# Patient Record
Sex: MALE | Race: OTHER | Employment: FULL TIME | URBAN - METROPOLITAN AREA
[De-identification: names, ages, dates, MRNs, and addresses within clinical notes are randomized per-mention and may not be internally consistent; named-entity substitution may affect disease eponyms.]

---

## 2021-10-14 ENCOUNTER — OFFICE VISIT (OUTPATIENT)
Dept: ORTHOPEDIC SURGERY | Age: 31
End: 2021-10-14
Payer: COMMERCIAL

## 2021-10-14 ENCOUNTER — HOSPITAL ENCOUNTER (OUTPATIENT)
Dept: ORTHOPEDIC SURGERY | Age: 31
Discharge: HOME | End: 2021-10-16
Payer: COMMERCIAL

## 2021-10-14 VITALS
WEIGHT: 262 LBS | HEIGHT: 72 IN | HEART RATE: 64 BPM | OXYGEN SATURATION: 99 % | TEMPERATURE: 97.4 F | BODY MASS INDEX: 35.49 KG/M2

## 2021-10-14 DIAGNOSIS — R52 PAIN: Primary | ICD-10-CM

## 2021-10-14 DIAGNOSIS — R52 PAIN: ICD-10-CM

## 2021-10-14 DIAGNOSIS — S83.401A SPRAIN OF COLLATERAL LIGAMENT OF RIGHT KNEE, INITIAL ENCOUNTER: ICD-10-CM

## 2021-10-14 PROCEDURE — 73562 X-RAY EXAM OF KNEE 3: CPT

## 2021-10-14 PROCEDURE — 99204 OFFICE O/P NEW MOD 45 MIN: CPT | Performed by: ORTHOPAEDIC SURGERY

## 2021-10-14 PROCEDURE — 73562 X-RAY EXAM OF KNEE 3: CPT | Performed by: ORTHOPAEDIC SURGERY

## 2021-10-14 RX ORDER — MELOXICAM 15 MG/1
15 TABLET ORAL DAILY
Qty: 45 TABLET | Refills: 0 | Status: SHIPPED | OUTPATIENT
Start: 2021-10-14 | End: 2021-10-14 | Stop reason: CLARIF

## 2021-10-14 RX ORDER — MELOXICAM 15 MG/1
15 TABLET ORAL DAILY
Qty: 45 TABLET | Refills: 0 | Status: SHIPPED | OUTPATIENT
Start: 2021-10-14 | End: 2022-08-05

## 2021-10-14 RX ORDER — M-VIT,TX,IRON,MINS/CALC/FOLIC 27MG-0.4MG
1 TABLET ORAL DAILY
COMMUNITY
End: 2022-08-05

## 2021-10-14 NOTE — PROGRESS NOTES
Subjective:      Patient ID: Erasmo Bang is a 36 y.o. male who presents today for:  Chief Complaint   Patient presents with    Knee Pain     right knee pain pain x 3 months       HPI  Patient with right knee pain.'s been going on for the last 3 months. No specific history of trauma. Pain is primarily medially in the knee. Denies any history of any trauma. It affects his daily activity. It affects his exercising. History reviewed. No pertinent past medical history. History reviewed. No pertinent surgical history. Social History     Socioeconomic History    Marital status: Not on file     Spouse name: Not on file    Number of children: Not on file    Years of education: Not on file    Highest education level: Not on file   Occupational History    Not on file   Tobacco Use    Smoking status: Never Smoker    Smokeless tobacco: Never Used   Vaping Use    Vaping Use: Never used   Substance and Sexual Activity    Alcohol use: Yes    Drug use: Never    Sexual activity: Yes   Other Topics Concern    Not on file   Social History Narrative    Not on file     Social Determinants of Health     Financial Resource Strain:     Difficulty of Paying Living Expenses:    Food Insecurity:     Worried About Running Out of Food in the Last Year:     [address] in the Last Year:    Transportation Needs:     Lack of Transportation (Medical):      Lack of Transportation (Non-Medical):    Physical Activity:     Days of Exercise per Week:     Minutes of Exercise per Session:    Stress:     Feeling of Stress :    Social Connections:     Frequency of Communication with Friends and Family:     Frequency of Social Gatherings with Friends and Family:     Attends Anglican Services:     Active Member of Clubs or Organizations:     Attends Club or Organization Meetings:     Marital Status:    Intimate Partner Violence:     Fear of Current or Ex-Partner:     Emotionally Abused:     Physically Abused:  Sexually Abused:      History reviewed. No pertinent family history. Allergies   Allergen Reactions    Pcn [Penicillins]      Current Outpatient Medications on File Prior to Visit   Medication Sig Dispense Refill    Multiple Vitamins-Minerals (THERAPEUTIC MULTIVITAMIN-MINERALS) tablet Take 1 tablet by mouth daily       No current facility-administered medications on file prior to visit. Review of Systems  Pain right knee. Pains medially. Feels like occasionally the knee has something locked inside. Denies any giving way. Denies any numbness and tingling. Objective:   Pulse 64   Temp 97.4 °F (36.3 °C) (Temporal)   Ht 6' (1.829 m)   Wt 262 lb (118.8 kg)   SpO2 99%   BMI 35.53 kg/m²     Ortho Exam  Is not oriented slightly overweight male on the no acute distress. Right knee shows no effusion, erythema, or ecchymosis. Pain on the medial joint line. No pain on the lateral joint line. No pain anteriorly or posteriorly. No calf tenderness. No palpable cords posteriorly. No crepitus range of motion. No pain to patellar compression. Range of motion from 0 degrees to 130 degrees of flexion. Neurological and vascular status is intact in the right lower extremity. Negative roll test the right hip. Radiographs and Laboratory Studies:     Diagnostic Imaging Studies:    XR KNEE RIGHT (3 VIEWS)    Result Date: 10/14/2021  AP lateral and sunrise view of the right knee that we obtained to evaluate the source of pain shows good alignment. No signs of any arthritis. The joint space. Laboratory Studies:   No results found for: WBC, HGB, HCT, MCV, PLT  No results found for: SEDRATE  No results found for: CRP    Assessment:      Diagnosis Orders   1. Pain  XR KNEE RIGHT (3 VIEWS)   2. Sprain of collateral ligament of right knee, initial encounter            Plan:      This time I discussed the problem with the patient we will go ahead and start him on anti-inflammatory consisting of meloxicam 15 mg every day we will see if this alleviate the discomfort we will see him back in 2 to 3 weeks for recheck. If he is no better at that point he may need further radiological evaluation in the form of an MRI. Orders Placed This Encounter   Procedures    XR KNEE RIGHT (3 VIEWS)     Standing Status:   Future     Number of Occurrences:   1     Standing Expiration Date:   10/14/2022     Scheduling Instructions:      Weightbearing AP both knees, right lateral, right merchant     Order Specific Question:   Reason for exam:     Answer:   knee pain     No orders of the defined types were placed in this encounter. No follow-ups on file.       Karen Carson MD

## 2021-12-15 ENCOUNTER — OFFICE VISIT (OUTPATIENT)
Dept: ORTHOPEDIC SURGERY | Age: 31
End: 2021-12-15
Payer: COMMERCIAL

## 2021-12-15 VITALS
WEIGHT: 262 LBS | HEART RATE: 63 BPM | HEIGHT: 72 IN | BODY MASS INDEX: 35.49 KG/M2 | TEMPERATURE: 98 F | OXYGEN SATURATION: 97 %

## 2021-12-15 DIAGNOSIS — S83.241D ACUTE MEDIAL MENISCAL TEAR, RIGHT, SUBSEQUENT ENCOUNTER: Primary | ICD-10-CM

## 2021-12-15 PROCEDURE — 99213 OFFICE O/P EST LOW 20 MIN: CPT | Performed by: PHYSICIAN ASSISTANT

## 2021-12-15 NOTE — PROGRESS NOTES
Patient ID:  Shari Pedro is a 36 y.o. male who presents today for evaluation of right knee pain. Injury: yes; 3 months ago he was jogging with his dog, his dog crossed in front of him he came down forcefully on his right leg in full extension and has had pain ever since  Metal Allergy: no    Location: right  knee pain, located on the medial and posterior aspect of the knee  Pain: yes; 4 on a scale of 1 to 10  Onset: sudden and associated with an incident - 3 months ago while jogging  Duration: 3 months  Frequency:  occurs constantly  Quality: aching, boring, soreness, stabbing and stiffness   Swelling: patient notes intermittent swelling of the joint  Aggravating factors: weight bearing activity, standing, walking, arising from sitting position, deep knee flexion and going up and down stairs  Alleviating factors: removing weight from leg, rest and cold  Mechanical symptoms: clicking and catching  Radiation: no    Activities: walking independently  Restriction:  decreased ambulatory tolerance and Difficult to climb steps repetitively  Progression:  worsening    Previous treatment:  anti-inflammatories, physical therapy, activity modification and Fabric knee sleeve  NSAIDs:  meloxicam  PT:  Home exercise schedule performed and completed    Medications:    Current Outpatient Medications on File Prior to Visit   Medication Sig Dispense Refill    Multiple Vitamins-Minerals (THERAPEUTIC MULTIVITAMIN-MINERALS) tablet Take 1 tablet by mouth daily      meloxicam (MOBIC) 15 MG tablet Take 1 tablet by mouth daily 45 tablet 0     No current facility-administered medications on file prior to visit. Allergies: Allergies   Allergen Reactions    Pcn [Penicillins]        Past Medical History:    No past medical history on file. Past Surgical History:    No past surgical history on file.     Social History:    Social History     Socioeconomic History    Marital status: Unknown     Spouse name: Not on file  Number of children: Not on file    Years of education: Not on file    Highest education level: Not on file   Occupational History    Not on file   Tobacco Use    Smoking status: Never Smoker    Smokeless tobacco: Never Used   Vaping Use    Vaping Use: Never used   Substance and Sexual Activity    Alcohol use: Yes    Drug use: Never    Sexual activity: Yes   Other Topics Concern    Not on file   Social History Narrative    Not on file     Social Determinants of Health     Financial Resource Strain:     Difficulty of Paying Living Expenses: Not on file   Food Insecurity:     Worried About Running Out of Food in the Last Year: Not on file    Nitish of Food in the Last Year: Not on file   Transportation Needs:     Lack of Transportation (Medical): Not on file    Lack of Transportation (Non-Medical): Not on file   Physical Activity:     Days of Exercise per Week: Not on file    Minutes of Exercise per Session: Not on file   Stress:     Feeling of Stress : Not on file   Social Connections:     Frequency of Communication with Friends and Family: Not on file    Frequency of Social Gatherings with Friends and Family: Not on file    Attends Hindu Services: Not on file    Active Member of [address] [place] [address] or Organizations: Not on file    Attends Club or Organization Meetings: Not on file    Marital Status: Not on file   Intimate Partner Violence:     Fear of Current or Ex-Partner: Not on file    Emotionally Abused: Not on file    Physically Abused: Not on file    Sexually Abused: Not on file   Housing Stability:     Unable to Pay for Housing in the Last Year: Not on file    Number of [place] in the Last Year: Not on file    Unstable Housing in the Last Year: Not on file       Family History:    No family history on file.     Occupation:      - Occupational requirements:  physical labor    Workers Compensation:  Have you missed work for this issue?  no  Is this issue being addressed under a worker's comp claim? no    Review of Systems:    Review of Systems    Physical Exam:    Examination of the right knee    Gait:  normal  Inspection:  neutral  Swelling:  none  Erythema:  none  Ecchymosis:  none  Effusion:  1+  Palpation:  medial joint line and popliteal fossa  Extension:  5  Flexion:  120  Strength:  5  Varus/Valgus Instability:  none  Anterior/Posterior Instability:  none  Andra:  positive  Thessaly:  positive  Modified Apley:  negative  Lachman:  negative  Patellar compression:  negative  Neurological/Vascular:  Sensation grossly intact. Dorsalis pedis palpable and 2+    Radiographs:  Radiographs of the right knee were personally reviewed, and they revealed:  no evidence of fracture    MRI: To be scheduled in the near future    Diagnosis:   Diagnosis Orders   1. Acute medial meniscal tear, right, subsequent encounter         Plan:    I explained to the patient and I am concerned about the possibility of medial meniscal tear. He does have clicking and catching with range of motion. He has been participating in 3 months of the physician prescribed home exercise program.  He has not noticed improvement. He has been taking anti-inflammatory medications with limited improvement. Has been using topical anti-inflammatory creams with no improvement. We will proceed with MRI scan looking for medial meniscal tear. Should he have a medial meniscal tear knee arthroscopy would be warranted. Orders Placed This Encounter   Procedures    MRI KNEE RIGHT WO CONTRAST     Standing Status:   Future     Standing Expiration Date:   12/15/2022       No orders of the defined types were placed in this encounter. Return for MRI Follow-up.     ANNABEL Thompson

## 2022-05-17 ENCOUNTER — HOSPITAL ENCOUNTER (OUTPATIENT)
Dept: MRI IMAGING | Age: 32
Discharge: HOME | End: 2022-05-19
Payer: COMMERCIAL

## 2022-05-17 DIAGNOSIS — S83.241D ACUTE MEDIAL MENISCAL TEAR, RIGHT, SUBSEQUENT ENCOUNTER: ICD-10-CM

## 2022-05-17 PROCEDURE — 73721 MRI JNT OF LWR EXTRE W/O DYE: CPT

## 2022-05-27 ENCOUNTER — OFFICE VISIT (OUTPATIENT)
Dept: ORTHOPEDIC SURGERY | Age: 32
End: 2022-05-27
Payer: COMMERCIAL

## 2022-05-27 VITALS
WEIGHT: 240 LBS | RESPIRATION RATE: 16 BRPM | OXYGEN SATURATION: 97 % | HEART RATE: 72 BPM | BODY MASS INDEX: 32.51 KG/M2 | HEIGHT: 72 IN | TEMPERATURE: 97.3 F

## 2022-05-27 DIAGNOSIS — S83.241D ACUTE MEDIAL MENISCAL TEAR, RIGHT, SUBSEQUENT ENCOUNTER: Primary | ICD-10-CM

## 2022-05-27 PROCEDURE — 99214 OFFICE O/P EST MOD 30 MIN: CPT | Performed by: PHYSICIAN ASSISTANT

## 2022-05-27 RX ORDER — KETOCONAZOLE 20 MG/ML
SHAMPOO TOPICAL
COMMUNITY
Start: 2022-05-19

## 2022-05-27 RX ORDER — KETOCONAZOLE 20 MG/G
CREAM TOPICAL
COMMUNITY
Start: 2022-05-19

## 2022-05-27 ASSESSMENT — ENCOUNTER SYMPTOMS: RESPIRATORY NEGATIVE: 1

## 2022-05-27 NOTE — PATIENT INSTRUCTIONS
Patient Education        Meniscus Tear: Care Instructions  Overview     The meniscus is rubbery tissue in the knee that acts as a shock absorber between the upper and lower leg bones. The meniscus also keeps your knee stable by spreading weight across it. Each knee has two menisci (plural of meniscus). You can tear a meniscus if you plant your foot and twist, or pivot. The meniscus also can wear down as you age, and it can tear from squatting orkneeling. Small tears may heal on their own with rest and some physical therapy. But a more serious tear may need surgery to repair it or to remove part of the meniscus. Your doctor may want you to see a doctor who specializes in bones andsports injuries. Follow-up care is a key part of your treatment and safety. Be sure to make and go to all appointments, and call your doctor if you are having problems. It's also a good idea to know your test results and keep alist of the medicines you take. How can you care for yourself at home?  Rest your knee when possible.  Do not squat or kneel.  Take pain medicines exactly as directed. ? If the doctor gave you a prescription medicine for pain, take it as prescribed. ? If you are not taking a prescription pain medicine, ask your doctor if you can take an over-the-counter medicine.  Put ice or a cold pack on your knee for 10 to 20 minutes at a time. Try to do this every 1 to 2 hours for the next 3 days (when you are awake) or until the swelling goes down. Put a thin cloth between the ice and your skin.  Prop up the sore leg on a pillow when you ice your knee or any time you sit or lie down during the next 3 days. Try to keep your leg above the level of your heart. This will help reduce swelling.  Follow your doctor's directions for using crutches or a knee brace, if suggested.  Follow your doctor's directions for exercises to keep your knee mobile and your leg muscles strong.  Here are a few exercises you can try if your doctor says it is okay. ? Quad sets: Lie down on the floor or the bed with your injured leg straight. Fully extend your leg--there should be no or little bend in your knee. Tighten the thigh (quadriceps) of your injured leg for 6 seconds. Do not lift your heel up. Relax your quadriceps for 10 seconds. Repeat this exercise 8 to 12 times several times during the day. ? Straight-leg raises: Lie down on the floor or the bed with your injured leg flat and your uninjured leg bent so that the bottom of your foot is on the floor or bed. Tighten the quadriceps of your injured leg. Keeping your knee as straight as possible, lift your injured leg off the bed until it is about 18 inches above the bed or floor. Lower your leg back down and relax for 5 seconds. Do 3 sets of 20 repetitions, or if you tire quickly, 3 sets of 8 to 12 repetitions. ? Heel raises: Stand with your feet a few inches apart. Rest your hands lightly on a counter or chair in front of you. Slowly raise your heels off the floor while keeping your knees straight. Hold for 3 seconds, then slowly lower your heels to the floor. Do 3 sets of 8 to 12 repetitions. ? Heel slides: Lie down on the floor or the bed with your leg flat. Slowly begin to slide your heel toward your rear end (buttocks), keeping your heel on the floor. Your knee will begin to bend. Slide your heel and bend your knee until it becomes a little sore and you can feel a small amount of pressure inside your knee. Hold this position for 10 seconds. Slide your heel back down until your leg is straight on the floor. Relax for 10 seconds. Repeat this exercise 20 times. When should you call for help? Watch closely for changes in your health, and be sure to contact your doctor if:     You have increasing knee pain or swelling or both.      Your knee is so sore or stiff that you cannot walk on it.      You do not get better as expected. Where can you learn more?   Go to [email or web address] and sign in to your [employer] account. Enter X661 in the [employer] box to learn more about \"Meniscus Tear: Care Instructions. \"     If you do not have an account, please click on the \"Sign Up Now\" link. Current as of: July 1, 2021               Content Version: 13.2  © 0167-5815 [employer], [facility]. Care instructions adapted under license by [facility] ([facility]). If you have questions about a medical condition or this instruction, always ask your healthcare professional. [place] [address] any warranty or liability for your use of this information.

## 2022-05-27 NOTE — PROGRESS NOTES
Panchito Powell (:  1990) is a 36 y.o. male,Established patient, here for evaluation of the following chief complaint(s):  Follow-up (to review MRI of right knee done 2022. Pt states no increase in symptoms.)         ASSESSMENT/PLAN:  1. Acute medial meniscal tear, right, subsequent encounter  -     Ambulatory referral to Orthopedic Surgery      No follow-ups on file. Subjective   SUBJECTIVE/OBJECTIVE:  Is a 72-year-old male with complaint of right knee pain. Patient states 2021 he was jogging with his dog, his dog crossed in front of him and he fell on his right knee. He has had persistent posterior medial knee pain. He states he is unable to pivot on the knee due to pain. He had been a ballroom dance instructor previously and has difficulty bearing weight entirely on his right knee. He denies locking however does click and pop. He has had an MRI scan and is here for results. Review of Systems   Constitutional: Negative. HENT: Negative. Respiratory: Negative. Skin: Negative. Neurological: Negative. Objective    RI scan performed May 17, 2022 shows:  EXAMINATION:  MRI KNEE RIGHT WO CONTRAST       HISTORY:  Fall one year ago. Right knee pain. Concern for medial meniscal tear.        TECHNIQUE: Routine non-contrast MRI of the knee RIGHT       COMPARISON:  Radiographs 10/14/2021.       RESULT:         MENISCI:    Medial Meniscus:    Area of increased signal within the periphery of the posterior horn near junction with the body, may be degenerative or subtle nondisplaced tear. Lateral Meniscus:    Area of apparent blunted morphology involving the anterior horn near the junction with the body, suspicious for possible tear.  Curvilinear area of fluid signal within the lateral gutter abutting the meniscus, may represent focal    fluid versus parameniscal cyst.       LIGAMENTS:    ACL, PCL, MCL, LCL COMPLEX:    Intact.       CARTILAGE: Cartilage appears grossly preserved without distinct full-thickness chondral defect.       TENDONS:    Distal quadriceps tendon, patellar tendon, and popliteus tendon appear intact.       BONES AND MARROW:  No evidence for acute fracture or marrow replacing process.        MUSCLES:   Muscle bulk and signal intensity within normal limits.       JOINT FLUID AND SYNOVIUM:    Small joint effusion. No synovitis. No Baker's cyst.          OTHER:  No other significant abnormality.           Impression       Possible medial meniscal tear.       Possible lateral meniscal tear with possible parameniscal cyst or focal fluid.                   Physical Exam  Musculoskeletal:      Comments: Right knee-no joint effusion, small popliteal cyst.  No warmth, erythema, fluctuance or sign of infection. Full extension, flexion to 130 degrees. The knee joint is stable, there is no laxity with valgus or varus stress. Tenderness with palpation of the posterior medial joint line. Lachman's is negative, Andra's does elicit medial joint line pain. Calf is soft and nontender. Patient has persistent posterior medial joint line pain. His MRI shows probable meniscal tear. I suggested consultation with one of the surgeons to discuss arthroscopic meniscectomy. Patient states this is persistent pain going on 10 months. He is having difficulty pivoting on the knee. It does interfere with his activities of daily living. He will continue taking anti-inflammatory medications. To be scheduled with one of the surgeons. On this date 5/27/2022 I have spent 35 minutes reviewing previous notes, test results and face to face with the patient discussing the diagnosis and importance of compliance with the treatment plan as well as documenting on the day of the visit. An electronic signature was used to authenticate this note.     --ANNABEL Norton

## 2022-06-10 ENCOUNTER — OFFICE VISIT (OUTPATIENT)
Dept: ORTHOPEDIC SURGERY | Age: 32
End: 2022-06-10
Payer: COMMERCIAL

## 2022-06-10 VITALS
WEIGHT: 234 LBS | HEART RATE: 67 BPM | TEMPERATURE: 97 F | OXYGEN SATURATION: 97 % | HEIGHT: 72 IN | BODY MASS INDEX: 31.69 KG/M2

## 2022-06-10 DIAGNOSIS — S83.241A TEAR OF MEDIAL MENISCUS OF RIGHT KNEE, CURRENT, UNSPECIFIED TEAR TYPE, INITIAL ENCOUNTER: Primary | ICD-10-CM

## 2022-06-10 DIAGNOSIS — S83.281A TEAR OF LATERAL MENISCUS OF RIGHT KNEE, CURRENT, UNSPECIFIED TEAR TYPE, INITIAL ENCOUNTER: ICD-10-CM

## 2022-06-10 PROCEDURE — 99244 OFF/OP CNSLTJ NEW/EST MOD 40: CPT | Performed by: ORTHOPAEDIC SURGERY

## 2022-06-10 ASSESSMENT — ENCOUNTER SYMPTOMS
DIARRHEA: 0
CONSTIPATION: 0
SHORTNESS OF BREATH: 0
EYE DISCHARGE: 0
COUGH: 0
EYE ITCHING: 0
ABDOMINAL PAIN: 0
EYE PAIN: 0

## 2022-06-10 NOTE — PROGRESS NOTES
This is a consult from [facility] for evaluation of the patient's right knee pain. It is a surgical consultation for arthroscopic partial medial and partial lateral meniscectomies. Patient ID:  Alex Preciado is a 29 y.o. male who presents today for evaluation of right knee pain. Injury: yes; Approximately 1 year ago when he injured it while jogging  Metal Allergy: no    Location: right  knee pain, located on the Primarily on the medial side but also on the lateral side to a lesser extent aspect of the knee  Pain: yes; 7 on a scale of 1 to 10  Onset: sudden  Duration: 1 years  Frequency:  occurs intermittently and occurs daily  Quality: sharp and stabbing   Swelling: patient notes intermittent swelling of the joint  Aggravating factors: weight bearing activity, walking, twisting and deep knee flexion  Alleviating factors: removing weight from leg and rest  Mechanical symptoms: catching and buckling  Radiation: no    Activities: walking independently  Restriction:  decreased ambulatory tolerance  Progression:  worsening    Previous treatment:  anti-inflammatories  NSAIDs:  ibuprofen/motrin  PT:  none    Medications:    Current Outpatient Medications on File Prior to Visit   Medication Sig Dispense Refill    ketoconazole (NIZORAL) 2 % cream Apply to affected area once daily. (can increase to twice daily for flares)      ketoconazole (NIZORAL) 2 % shampoo Massage into scalp, let sit for 10 mins, rinse 3 times weekly      Multiple Vitamins-Minerals (THERAPEUTIC MULTIVITAMIN-MINERALS) tablet Take 1 tablet by mouth daily      meloxicam (MOBIC) 15 MG tablet Take 1 tablet by mouth daily 45 tablet 0     No current facility-administered medications on file prior to visit. Allergies: Allergies   Allergen Reactions    Pcn [Penicillins]        Past Medical History:    History reviewed. No pertinent past medical history. Past Surgical History:    History reviewed.  No pertinent surgical history. Social History:    Social History     Socioeconomic History    Marital status:      Spouse name: Not on file    Number of children: Not on file    Years of education: Not on file    Highest education level: Not on file   Occupational History    Not on file   Tobacco Use    Smoking status: Never Smoker    Smokeless tobacco: Never Used   Vaping Use    Vaping Use: Never used   Substance and Sexual Activity    Alcohol use: Yes    Drug use: Never    Sexual activity: Yes   Other Topics Concern    Not on file   Social History Narrative    Not on file     Social Determinants of Health     Financial Resource Strain:     Difficulty of Paying Living Expenses: Not on file   Food Insecurity:     Worried About Running Out of Food in the Last Year: Not on file    Nitish of Food in the Last Year: Not on file   Transportation Needs:     Lack of Transportation (Medical): Not on file    Lack of Transportation (Non-Medical): Not on file   Physical Activity:     Days of Exercise per Week: Not on file    Minutes of Exercise per Session: Not on file   Stress:     Feeling of Stress : Not on file   Social Connections:     Frequency of Communication with Friends and Family: Not on file    Frequency of Social Gatherings with Friends and Family: Not on file    Attends Sabianism Services: Not on file    Active Member of [address] [place] [address] [employer] or Organizations: Not on file    Attends Club or Organization Meetings: Not on file    Marital Status: Not on file   Intimate Partner Violence:     Fear of Current or Ex-Partner: Not on file    Emotionally Abused: Not on file    Physically Abused: Not on file    Sexually Abused: Not on file   Housing Stability:     Unable to Pay for Housing in the Last Year: Not on file    Number of [place] in the Last Year: Not on file    Unstable Housing in the Last Year: Not on file       Family History:    History reviewed. No pertinent family history.     Occupation:  Asked but not answered   - Occupational requirements:  Unknown    Workers Compensation:  Have you missed work for this issue?  no  Is this issue being addressed under a worker's comp claim? no    Review of Systems:    Review of Systems   Constitutional: Negative for activity change, appetite change and chills. HENT: Negative for congestion, ear pain and hearing loss. Eyes: Negative for pain, discharge and itching. Respiratory: Negative for cough and shortness of breath. Cardiovascular: Negative for chest pain and leg swelling. Gastrointestinal: Negative for abdominal pain, constipation and diarrhea. Endocrine: Negative for cold intolerance, heat intolerance and polydipsia. Genitourinary: Negative for difficulty urinating, flank pain and frequency. Skin: Negative for rash and wound. Allergic/Immunologic: Negative for environmental allergies and food allergies. Neurological: Negative for dizziness, seizures and syncope. Physical Exam:    Examination of the right knee    Gait:  normal  Inspection:  neutral  Swelling:  none  Erythema:  none  Ecchymosis:  none  Effusion:  1+  Palpation:  medial joint line and lateral joint line  Extension:  5  Flexion:  120  Strength:  5  Varus/Valgus Instability:  none  Anterior/Posterior Instability:  none  Andra:  negative  Thessaly:  positive  Modified Apley:  positive  Lachman:  negative  Patellar compression:  negative  Neurological/Vascular:  Sensation grossly intact. Dorsalis pedis palpable and 2+    Radiographs:  Radiographs of the right knee were personally reviewed, and they revealed:  AP lateral and sunrise view of the right knee that we obtained to evaluate    the source of pain shows good alignment.  No signs of any arthritis.  The    joint space.          MRI:    RESULT:         MENISCI:    Medial Meniscus:    Area of increased signal within the periphery of the posterior horn near junction with the body, may be degenerative or subtle nondisplaced tear.   Lateral Meniscus:    Area of apparent blunted morphology involving the anterior horn near the junction with the body, suspicious for possible tear. Curvilinear area of fluid signal within the lateral gutter abutting the meniscus, may represent focal    fluid versus parameniscal cyst.       LIGAMENTS:    ACL, PCL, MCL, LCL COMPLEX:    Intact.       CARTILAGE: Cartilage appears grossly preserved without distinct full-thickness chondral defect.       TENDONS:    Distal quadriceps tendon, patellar tendon, and popliteus tendon appear intact.       BONES AND MARROW:  No evidence for acute fracture or marrow replacing process.        MUSCLES:   Muscle bulk and signal intensity within normal limits.       JOINT FLUID AND SYNOVIUM:    Small joint effusion. No synovitis. No Baker's cyst.          OTHER:  No other significant abnormality.           Impression       Possible medial meniscal tear.       Possible lateral meniscal tear with possible parameniscal cyst or focal fluid. Diagnosis:   Diagnosis Orders   1. Tear of medial meniscus of right knee, current, unspecified tear type, initial encounter  CBC with Auto Differential    Basic Metabolic Panel    Protime-INR   2. Tear of lateral meniscus of right knee, current, unspecified tear type, initial encounter  CBC with Auto Differential    Basic Metabolic Panel    Protime-INR       Plan:    The patient has evidence of medial and lateral meniscal tearing on the MRI. He also has positive meniscal examination findings. Patient continues to have sharp stabbing intermittent pains primarily on the medial side of the knee but also on the lateral side of the knee. He is frustrated because he has not gotten better over the past year. Treatment options were discussed. Ultimately, we settled on an arthroscopic partial medial and partial lateral meniscectomy. We talked about the surgery. We talked about the recovery.   We went over the risk benefits and alternatives. Risks include but are not limited to infection, persistent postoperative pain, and risks of anesthesia. Patient expressed understanding and consented. We will plan on proceeding with a right knee partial medial and partial lateral meniscectomy. Orders Placed This Encounter   Procedures    CBC with Auto Differential     Standing Status:   Future     Standing Expiration Date:   6/10/2023    Basic Metabolic Panel     Standing Status:   Future     Standing Expiration Date:   6/10/2023    Protime-INR     Standing Status:   Future     Standing Expiration Date:   6/10/2023     Order Specific Question:   Daily Coumadin Dose? Answer:   NO       No orders of the defined types were placed in this encounter. Return for Postop. Russell Kirby MD        Surgery Phone: [phone]   [facility] and [facility]   Surgery Fax: [phone]    Phone: [phone]          Fax: [phone]: Surgery Scheduling, PAT & PRE-OP Order Form  Call to advance [place] at [phone] at least 24 hours prior to date of service     Surgery Location: [facility] Surgery: [address], [address] [facility]  Leighton Joya MD Surgery Date: 2022 time: To follow  Patient's Name: Kenny Rogers : 1990    SS#:  [id number]  Gender: male  Home Phone:  [phone] Cell Phone: [phone]  Emergency Contact:  Gorge Flores   Phone: [phone]  Payor: [address] [place] [address] /  /  /    ID No.: [id number]      PROVIDER TO COMPLETE:  Diagnosis: The primary encounter diagnosis was Tear of medial meniscus of right knee, current, unspecified tear type, initial encounter. A diagnosis of Tear of lateral meniscus of right knee, current, unspecified tear type, initial encounter was also pertinent to this visit.    Procedure/Consent: Right partial medial and partial lateral meniscectomy  CPT CODES: 81311  Case Comments/Implants: Arthroscopy equipment  Surgery Scheduled as:  Outpatient  Anesthesia Requested: Choice with adductor canal block  Referring Family Doctor: DO KARRI Drummond  [x] [id number] [facility] Date/Time:                                                            [x] History & Physical [] Physician will Provide [] Attached [] Dictated [] Other  [x] Follow Anesthesia Pre-Op Orders for X-rays, [facility]     [x] SN & PT to evaluate and treat/educate disease management, medications, home safety & equipment needs for total joint patients  [] Other: ____________________________________________________  Consults: Medical/Cardiac Clearance done by  ____________________  PRE-OP ORDERS:   Allergies: Pcn [penicillins] Latex Allergies:             Diabetic:           [] IV ________________________  [x] IV Start with J-loop     Preprinted Orders: Attached [] Yes [] No   ANTIBIOTIC PRE-OP: [x] ANCEF 2 gram IVPB if > 120 kg 3 grams IVPB within 1 hour of incision, if ALLERGIC, use VANCOMYCIN 1 gram IV, 2 hours pre-op  [] TXA Protocol [] Other:   [x] NPO   [] Betablocker (if needed) _____________________________________   [] Knee high anti-embolic hose [] Thigh high anti-embolic hose   Other: ______________________________________________________    Physician Signature Required:    Date/Time: 6/10/2022

## 2022-06-22 ENCOUNTER — TELEPHONE (OUTPATIENT)
Dept: ORTHOPEDIC SURGERY | Age: 32
End: 2022-06-22

## 2022-06-22 NOTE — TELEPHONE ENCOUNTER
Surgery Scheduling and Authorization Information    Surgery Date:7/18/2022  Surgery good through dates:   CPT Code(s) 90288  Procedure(s) Right arthroscopic partial medial & lateral menisectomies       Approved [] Not Required [x] Denied []  Reference # [id number]/ Isreal Monk #    How authorization obtained:  [] web portal             [x] via phone       [] Via fax    Provider  [] Niki Moses  [] Isa Duane  [x] Kevon Masters  [] Lady Coughlin  [] Jeffery Zendejas  [] Fatoumata Hennessy  [] Harriet Pantoja  [] Danny Joe  [] Miguelangel Page  [] Daxa Gill      Surgery Sheet Faxed to Scheduling [x]  Surgery and Prior Authorization Information Sheet Scanned [x]

## 2022-07-12 ENCOUNTER — OFFICE VISIT (OUTPATIENT)
Dept: ORTHOPEDIC SURGERY | Age: 32
End: 2022-07-12
Payer: COMMERCIAL

## 2022-07-12 VITALS
TEMPERATURE: 97.7 F | SYSTOLIC BLOOD PRESSURE: 118 MMHG | DIASTOLIC BLOOD PRESSURE: 85 MMHG | WEIGHT: 228.2 LBS | HEIGHT: 72 IN | HEART RATE: 55 BPM | BODY MASS INDEX: 30.91 KG/M2 | OXYGEN SATURATION: 99 %

## 2022-07-12 DIAGNOSIS — Z01.818 PREOP EXAMINATION: Primary | ICD-10-CM

## 2022-07-12 DIAGNOSIS — S83.241A TEAR OF MEDIAL MENISCUS OF RIGHT KNEE, CURRENT, UNSPECIFIED TEAR TYPE, INITIAL ENCOUNTER: ICD-10-CM

## 2022-07-12 DIAGNOSIS — S83.281A TEAR OF LATERAL MENISCUS OF RIGHT KNEE, CURRENT, UNSPECIFIED TEAR TYPE, INITIAL ENCOUNTER: ICD-10-CM

## 2022-07-12 LAB
ANION GAP SERPL CALCULATED.3IONS-SCNC: 15 MEQ/L (ref 9–15)
BASOPHILS ABSOLUTE: 0 K/UL (ref 0–0.2)
BASOPHILS RELATIVE PERCENT: 0.2 %
BUN BLDV-MCNC: 9 MG/DL (ref 6–20)
CALCIUM SERPL-MCNC: 9.7 MG/DL (ref 8.5–9.9)
CHLORIDE BLD-SCNC: 102 MEQ/L (ref 95–107)
CO2: 26 MEQ/L (ref 20–31)
CREAT SERPL-MCNC: 0.73 MG/DL (ref 0.7–1.2)
EOSINOPHILS ABSOLUTE: 0.1 K/UL (ref 0–0.7)
EOSINOPHILS RELATIVE PERCENT: 0.9 %
GFR AFRICAN AMERICAN: >60
GFR NON-AFRICAN AMERICAN: >60
GLUCOSE BLD-MCNC: 76 MG/DL (ref 70–99)
HCT VFR BLD CALC: 47.1 % (ref 42–52)
HEMOGLOBIN: 16.1 G/DL (ref 14–18)
INR BLD: 1
LYMPHOCYTES ABSOLUTE: 1.7 K/UL (ref 1–4.8)
LYMPHOCYTES RELATIVE PERCENT: 28.8 %
MCH RBC QN AUTO: 32.4 PG (ref 27–31.3)
MCHC RBC AUTO-ENTMCNC: 34.3 % (ref 33–37)
MCV RBC AUTO: 94.5 FL (ref 80–100)
MONOCYTES ABSOLUTE: 0.4 K/UL (ref 0.2–0.8)
MONOCYTES RELATIVE PERCENT: 7.5 %
NEUTROPHILS ABSOLUTE: 3.7 K/UL (ref 1.4–6.5)
NEUTROPHILS RELATIVE PERCENT: 62.6 %
PDW BLD-RTO: 13.3 % (ref 11.5–14.5)
PLATELET # BLD: 186 K/UL (ref 130–400)
POTASSIUM SERPL-SCNC: 3.7 MEQ/L (ref 3.4–4.9)
PROTHROMBIN TIME: 13.5 SEC (ref 12.3–14.9)
RBC # BLD: 4.98 M/UL (ref 4.7–6.1)
SODIUM BLD-SCNC: 143 MEQ/L (ref 135–144)
WBC # BLD: 5.9 K/UL (ref 4.8–10.8)

## 2022-07-12 PROCEDURE — 99212 OFFICE O/P EST SF 10 MIN: CPT | Performed by: PHYSICIAN ASSISTANT

## 2022-07-12 NOTE — PATIENT INSTRUCTIONS
-please walk over to our lab for your blood work, located right outside our office near the elevators    -stop taking asprin and motrin until after your surgery. All blood thinners need to be stopped at least 5 days in advance prior to surgery. If you experiencing pain, the only medication you can take for that is tylenol 3-4x / day not to exceed 4000 mg.  -our surgery department will reach out the you the day before your surgery and let you know what time to arrive at the [address] [place] [address] Road (door B)  -no eating / drinking the after midnight the night before surgery.  Sips of water the morning of for medications is OK  -if you have any questions, please call our office and I will be happy to answer them

## 2022-07-12 NOTE — PROGRESS NOTES
[place] [address] and Sports Medicine    H&P: Preadmission Testing     Patient: Hayden Zazueta  YOB: 1990  MRN: [id number]    Subjective:     Chief Complaint   Patient presents with    Pre-op Exam     right partial medial and partia; lateral meniscectomy/ surgery is 07/18/2022 with Dr Perla Saravia       HPI: Hayden Zazueta is a 29 y.o. Nancey Dean here for preop evaluation for meniscectomy with Dr. Perla Saravia on the right side was referring surgeon. No pertinent medical history. There is no known history of heart disease or infarction. There is no recent chest pain or discomfort, palpitations, shortness of breath, GARZA, difficulties with exercise, bilateral ankle swelling. Not taking any blood thinners. There is no known history of obstructive or restrictive lung disease. No smoking. No recent wheezing or use of any kind of inhalers. No recent hospitalizations for any lung-related illnesses. No recent Covid infection. No known history of gastric issues which includes ulcers, herniations, bariatric surgeries. No recent GI bleeds    No known history of hyper or hypercoagulable states. They are not diabetic. They have normal kidney function. Past Medical History:    No past medical history on file. Past Surgical History:    No past surgical history on file. Medications Prior to Admission:    Current Outpatient Medications   Medication Sig Dispense Refill    ketoconazole (NIZORAL) 2 % cream Apply to affected area once daily. (can increase to twice daily for flares)      ketoconazole (NIZORAL) 2 % shampoo Massage into scalp, let sit for 10 mins, rinse 3 times weekly      Multiple Vitamins-Minerals (THERAPEUTIC MULTIVITAMIN-MINERALS) tablet Take 1 tablet by mouth daily      meloxicam (MOBIC) 15 MG tablet Take 1 tablet by mouth daily 45 tablet 0     No current facility-administered medications for this visit.        Allergies:    Pcn [penicillins]    Social History:   Social History     Socioeconomic History    Marital status:      Spouse name: Not on file    Number of children: Not on file    Years of education: Not on file    Highest education level: Not on file   Occupational History    Not on file   Tobacco Use    Smoking status: Never Smoker    Smokeless tobacco: Never Used   Vaping Use    Vaping Use: Never used   Substance and Sexual Activity    Alcohol use: Yes    Drug use: Never    Sexual activity: Yes   Other Topics Concern    Not on file   Social History Narrative    Not on file     Social Determinants of Health     Financial Resource Strain:     Difficulty of Paying Living Expenses: Not on file   Food Insecurity:     Worried About Running Out of Food in the Last Year: Not on file    Nitish of Food in the Last Year: Not on file   Transportation Needs:     Lack of Transportation (Medical): Not on file    Lack of Transportation (Non-Medical): Not on file   Physical Activity:     Days of Exercise per Week: Not on file    Minutes of Exercise per Session: Not on file   Stress:     Feeling of Stress : Not on file   Social Connections:     Frequency of Communication with Friends and Family: Not on file    Frequency of Social Gatherings with Friends and Family: Not on file    Attends Holiness Services: Not on file    Active Member of [address] [place] [address] [employer] or Organizations: Not on file    Attends Club or Organization Meetings: Not on file    Marital Status: Not on file   Intimate Partner Violence:     Fear of Current or Ex-Partner: Not on file    Emotionally Abused: Not on file    Physically Abused: Not on file    Sexually Abused: Not on file   Housing Stability:     Unable to Pay for Housing in the Last Year: Not on file    Number of [place] in the Last Year: Not on file    Unstable Housing in the Last Year: Not on file       Family History:   No family history on file.     Objective:   /85 (Site: Left Upper Arm, Position: Sitting, Cuff Size: Large Adult)   Pulse 55   Temp 97.7 °F (36.5 °C) (Temporal)   Ht 6' (1.829 m)   Wt 228 lb 3.2 oz (103.5 kg)   SpO2 99%   BMI 30.95 kg/m²     Physical Exam  Constitutional:       General: He is not in acute distress. Appearance: Normal appearance. He is not ill-appearing. HENT:      Head: Normocephalic. Nose: Nose normal. No congestion or rhinorrhea. Mouth/Throat:      Mouth: Mucous membranes are moist.      Pharynx: Oropharynx is clear. No oropharyngeal exudate or posterior oropharyngeal erythema. Eyes:      Extraocular Movements: Extraocular movements intact. Pupils: Pupils are equal, round, and reactive to light. Cardiovascular:      Rate and Rhythm: Normal rate and regular rhythm. Pulses: Normal pulses. Heart sounds: Normal heart sounds. Pulmonary:      Effort: Pulmonary effort is normal.      Breath sounds: Normal breath sounds. No wheezing, rhonchi or rales. Abdominal:      General: Abdomen is flat. Bowel sounds are normal.      Palpations: Abdomen is soft. Tenderness: There is no abdominal tenderness. Skin:     General: Skin is warm and dry. Capillary Refill: Capillary refill takes less than 2 seconds. Comments: No swelling or erythema over the incision site   Neurological:      General: No focal deficit present. Mental Status: He is alert and oriented to person, place, and time. Radiographs and Laboratory Studies:       Laboratory Studies:   No results found for: WBC, HGB, HCT, MCV, PLT  No results found for: SEDRATE  No results found for: CRP    Assessment and Plan:      Diagnosis Orders   1. Preop examination         Procedure: Right meniscectomy with Dr. Rose henriquez was ordered and will be reviewed. He can follow-up with Dr. Adan Villar or myself after the surgery. Crutches were given to him to bring with  him the day of his surgery.     Juan Manuel Euceda PA-C  \[facility]\"" and Sports Medicine  [phone]

## 2022-07-18 ENCOUNTER — ANESTHESIA EVENT (OUTPATIENT)
Dept: OPERATING ROOM | Age: 32
End: 2022-07-18
Payer: COMMERCIAL

## 2022-07-18 ENCOUNTER — ANESTHESIA (OUTPATIENT)
Dept: OPERATING ROOM | Age: 32
End: 2022-07-18
Payer: COMMERCIAL

## 2022-07-18 ENCOUNTER — HOSPITAL ENCOUNTER (OUTPATIENT)
Age: 32
Setting detail: OUTPATIENT SURGERY
Discharge: HOME | End: 2022-07-18
Attending: ORTHOPAEDIC SURGERY | Admitting: ORTHOPAEDIC SURGERY
Payer: COMMERCIAL

## 2022-07-18 VITALS
OXYGEN SATURATION: 100 % | BODY MASS INDEX: 30.88 KG/M2 | DIASTOLIC BLOOD PRESSURE: 81 MMHG | RESPIRATION RATE: 20 BRPM | HEIGHT: 72 IN | HEART RATE: 78 BPM | TEMPERATURE: 97.4 F | SYSTOLIC BLOOD PRESSURE: 131 MMHG | WEIGHT: 228 LBS

## 2022-07-18 DIAGNOSIS — S83.271D COMPLEX TEAR OF LATERAL MENISCUS OF RIGHT KNEE AS CURRENT INJURY, SUBSEQUENT ENCOUNTER: ICD-10-CM

## 2022-07-18 DIAGNOSIS — S83.241D TEAR OF MEDIAL MENISCUS OF RIGHT KNEE, CURRENT, UNSPECIFIED TEAR TYPE, SUBSEQUENT ENCOUNTER: Primary | ICD-10-CM

## 2022-07-18 PROCEDURE — 7100000010 HC PHASE II RECOVERY - FIRST 15 MIN: Performed by: ORTHOPAEDIC SURGERY

## 2022-07-18 PROCEDURE — 3600000004 HC SURGERY LEVEL 4 BASE: Performed by: ORTHOPAEDIC SURGERY

## 2022-07-18 PROCEDURE — 7100000000 HC PACU RECOVERY - FIRST 15 MIN: Performed by: ORTHOPAEDIC SURGERY

## 2022-07-18 PROCEDURE — 2709999900 HC NON-CHARGEABLE SUPPLY: Performed by: ORTHOPAEDIC SURGERY

## 2022-07-18 PROCEDURE — 6360000002 HC RX W HCPCS: Performed by: ANESTHESIOLOGY

## 2022-07-18 PROCEDURE — 6360000002 HC RX W HCPCS: Performed by: ORTHOPAEDIC SURGERY

## 2022-07-18 PROCEDURE — 76942 ECHO GUIDE FOR BIOPSY: CPT | Performed by: ANESTHESIOLOGY

## 2022-07-18 PROCEDURE — 29880 ARTHRS KNE SRG MNISECTMY M&L: CPT | Performed by: ORTHOPAEDIC SURGERY

## 2022-07-18 PROCEDURE — 3700000001 HC ADD 15 MINUTES (ANESTHESIA): Performed by: ORTHOPAEDIC SURGERY

## 2022-07-18 PROCEDURE — 2580000003 HC RX 258: Performed by: ANESTHESIOLOGY

## 2022-07-18 PROCEDURE — 2580000003 HC RX 258: Performed by: ORTHOPAEDIC SURGERY

## 2022-07-18 PROCEDURE — 7100000011 HC PHASE II RECOVERY - ADDTL 15 MIN: Performed by: ORTHOPAEDIC SURGERY

## 2022-07-18 PROCEDURE — 3700000000 HC ANESTHESIA ATTENDED CARE: Performed by: ORTHOPAEDIC SURGERY

## 2022-07-18 PROCEDURE — 2500000003 HC RX 250 WO HCPCS: Performed by: ORTHOPAEDIC SURGERY

## 2022-07-18 PROCEDURE — 6360000002 HC RX W HCPCS: Performed by: NURSE ANESTHETIST, CERTIFIED REGISTERED

## 2022-07-18 PROCEDURE — 2500000003 HC RX 250 WO HCPCS: Performed by: NURSE ANESTHETIST, CERTIFIED REGISTERED

## 2022-07-18 PROCEDURE — 3600000014 HC SURGERY LEVEL 4 ADDTL 15MIN: Performed by: ORTHOPAEDIC SURGERY

## 2022-07-18 PROCEDURE — 2500000003 HC RX 250 WO HCPCS: Performed by: ANESTHESIOLOGY

## 2022-07-18 RX ORDER — SODIUM CHLORIDE 0.9 % (FLUSH) 0.9 %
5-40 SYRINGE (ML) INJECTION PRN
Status: DISCONTINUED | OUTPATIENT
Start: 2022-07-18 | End: 2022-07-18 | Stop reason: HOSPADM

## 2022-07-18 RX ORDER — LIDOCAINE HYDROCHLORIDE 10 MG/ML
INJECTION, SOLUTION EPIDURAL; INFILTRATION; INTRACAUDAL; PERINEURAL PRN
Status: DISCONTINUED | OUTPATIENT
Start: 2022-07-18 | End: 2022-07-18 | Stop reason: SDUPTHER

## 2022-07-18 RX ORDER — OXYCODONE HYDROCHLORIDE AND ACETAMINOPHEN 5; 325 MG/1; MG/1
1 TABLET ORAL EVERY 6 HOURS PRN
Qty: 28 TABLET | Refills: 0 | Status: SHIPPED | OUTPATIENT
Start: 2022-07-18 | End: 2022-07-25

## 2022-07-18 RX ORDER — SODIUM CHLORIDE 9 MG/ML
INJECTION, SOLUTION INTRAVENOUS PRN
Status: DISCONTINUED | OUTPATIENT
Start: 2022-07-18 | End: 2022-07-18 | Stop reason: HOSPADM

## 2022-07-18 RX ORDER — FENTANYL CITRATE 50 UG/ML
INJECTION, SOLUTION INTRAMUSCULAR; INTRAVENOUS PRN
Status: DISCONTINUED | OUTPATIENT
Start: 2022-07-18 | End: 2022-07-18 | Stop reason: SDUPTHER

## 2022-07-18 RX ORDER — MIDAZOLAM HYDROCHLORIDE 1 MG/ML
INJECTION INTRAMUSCULAR; INTRAVENOUS PRN
Status: DISCONTINUED | OUTPATIENT
Start: 2022-07-18 | End: 2022-07-18 | Stop reason: SDUPTHER

## 2022-07-18 RX ORDER — ONDANSETRON 2 MG/ML
INJECTION INTRAMUSCULAR; INTRAVENOUS PRN
Status: DISCONTINUED | OUTPATIENT
Start: 2022-07-18 | End: 2022-07-18 | Stop reason: SDUPTHER

## 2022-07-18 RX ORDER — ONDANSETRON 2 MG/ML
4 INJECTION INTRAMUSCULAR; INTRAVENOUS
Status: DISCONTINUED | OUTPATIENT
Start: 2022-07-18 | End: 2022-07-18 | Stop reason: HOSPADM

## 2022-07-18 RX ORDER — FENTANYL CITRATE 50 UG/ML
25 INJECTION, SOLUTION INTRAMUSCULAR; INTRAVENOUS EVERY 5 MIN PRN
Status: DISCONTINUED | OUTPATIENT
Start: 2022-07-18 | End: 2022-07-18 | Stop reason: HOSPADM

## 2022-07-18 RX ORDER — LIDOCAINE HYDROCHLORIDE 20 MG/ML
INJECTION, SOLUTION EPIDURAL; INFILTRATION; INTRACAUDAL; PERINEURAL PRN
Status: DISCONTINUED | OUTPATIENT
Start: 2022-07-18 | End: 2022-07-18 | Stop reason: HOSPADM

## 2022-07-18 RX ORDER — EPHEDRINE SULFATE/0.9% NACL/PF 50 MG/5 ML
SYRINGE (ML) INTRAVENOUS PRN
Status: DISCONTINUED | OUTPATIENT
Start: 2022-07-18 | End: 2022-07-18 | Stop reason: SDUPTHER

## 2022-07-18 RX ORDER — DEXAMETHASONE SODIUM PHOSPHATE 10 MG/ML
INJECTION INTRAMUSCULAR; INTRAVENOUS PRN
Status: DISCONTINUED | OUTPATIENT
Start: 2022-07-18 | End: 2022-07-18 | Stop reason: SDUPTHER

## 2022-07-18 RX ORDER — BUPIVACAINE HYDROCHLORIDE 5 MG/ML
INJECTION, SOLUTION EPIDURAL; INTRACAUDAL
Status: COMPLETED | OUTPATIENT
Start: 2022-07-18 | End: 2022-07-18

## 2022-07-18 RX ORDER — HYDRALAZINE HYDROCHLORIDE 20 MG/ML
10 INJECTION INTRAMUSCULAR; INTRAVENOUS
Status: DISCONTINUED | OUTPATIENT
Start: 2022-07-18 | End: 2022-07-18 | Stop reason: HOSPADM

## 2022-07-18 RX ORDER — MEPERIDINE HYDROCHLORIDE 25 MG/ML
12.5 INJECTION INTRAMUSCULAR; INTRAVENOUS; SUBCUTANEOUS EVERY 5 MIN PRN
Status: DISCONTINUED | OUTPATIENT
Start: 2022-07-18 | End: 2022-07-18 | Stop reason: HOSPADM

## 2022-07-18 RX ORDER — SODIUM CHLORIDE 0.9 % (FLUSH) 0.9 %
5-40 SYRINGE (ML) INJECTION EVERY 12 HOURS SCHEDULED
Status: DISCONTINUED | OUTPATIENT
Start: 2022-07-18 | End: 2022-07-18 | Stop reason: HOSPADM

## 2022-07-18 RX ORDER — MORPHINE SULFATE 2 MG/ML
2 INJECTION, SOLUTION INTRAMUSCULAR; INTRAVENOUS
Status: DISCONTINUED | OUTPATIENT
Start: 2022-07-18 | End: 2022-07-18 | Stop reason: HOSPADM

## 2022-07-18 RX ORDER — GLYCOPYRROLATE 1 MG/5 ML
SYRINGE (ML) INTRAVENOUS PRN
Status: DISCONTINUED | OUTPATIENT
Start: 2022-07-18 | End: 2022-07-18 | Stop reason: SDUPTHER

## 2022-07-18 RX ORDER — SODIUM CHLORIDE 9 MG/ML
25 INJECTION, SOLUTION INTRAVENOUS PRN
Status: DISCONTINUED | OUTPATIENT
Start: 2022-07-18 | End: 2022-07-18 | Stop reason: HOSPADM

## 2022-07-18 RX ORDER — PROPOFOL 10 MG/ML
INJECTION, EMULSION INTRAVENOUS PRN
Status: DISCONTINUED | OUTPATIENT
Start: 2022-07-18 | End: 2022-07-18 | Stop reason: SDUPTHER

## 2022-07-18 RX ORDER — SODIUM CHLORIDE, SODIUM LACTATE, POTASSIUM CHLORIDE, CALCIUM CHLORIDE 600; 310; 30; 20 MG/100ML; MG/100ML; MG/100ML; MG/100ML
INJECTION, SOLUTION INTRAVENOUS CONTINUOUS
Status: DISCONTINUED | OUTPATIENT
Start: 2022-07-18 | End: 2022-07-18 | Stop reason: HOSPADM

## 2022-07-18 RX ORDER — LABETALOL HYDROCHLORIDE 5 MG/ML
10 INJECTION, SOLUTION INTRAVENOUS
Status: DISCONTINUED | OUTPATIENT
Start: 2022-07-18 | End: 2022-07-18 | Stop reason: HOSPADM

## 2022-07-18 RX ORDER — MAGNESIUM HYDROXIDE 1200 MG/15ML
LIQUID ORAL CONTINUOUS PRN
Status: DISCONTINUED | OUTPATIENT
Start: 2022-07-18 | End: 2022-07-18 | Stop reason: HOSPADM

## 2022-07-18 RX ORDER — OXYCODONE HYDROCHLORIDE 5 MG/1
5 TABLET ORAL EVERY 4 HOURS PRN
Status: DISCONTINUED | OUTPATIENT
Start: 2022-07-18 | End: 2022-07-18 | Stop reason: HOSPADM

## 2022-07-18 RX ORDER — METOCLOPRAMIDE HYDROCHLORIDE 5 MG/ML
10 INJECTION INTRAMUSCULAR; INTRAVENOUS
Status: DISCONTINUED | OUTPATIENT
Start: 2022-07-18 | End: 2022-07-18 | Stop reason: HOSPADM

## 2022-07-18 RX ORDER — MORPHINE SULFATE 4 MG/ML
4 INJECTION, SOLUTION INTRAMUSCULAR; INTRAVENOUS
Status: DISCONTINUED | OUTPATIENT
Start: 2022-07-18 | End: 2022-07-18 | Stop reason: HOSPADM

## 2022-07-18 RX ORDER — OXYCODONE HYDROCHLORIDE 5 MG/1
5 TABLET ORAL
Status: DISCONTINUED | OUTPATIENT
Start: 2022-07-18 | End: 2022-07-18 | Stop reason: HOSPADM

## 2022-07-18 RX ADMIN — MIDAZOLAM HYDROCHLORIDE 2 MG: 1 INJECTION, SOLUTION INTRAMUSCULAR; INTRAVENOUS at 07:30

## 2022-07-18 RX ADMIN — BUPIVACAINE HYDROCHLORIDE 30 ML: 5 INJECTION, SOLUTION EPIDURAL; INTRACAUDAL at 07:07

## 2022-07-18 RX ADMIN — SODIUM CHLORIDE, POTASSIUM CHLORIDE, SODIUM LACTATE AND CALCIUM CHLORIDE: 600; 310; 30; 20 INJECTION, SOLUTION INTRAVENOUS at 06:12

## 2022-07-18 RX ADMIN — LIDOCAINE HYDROCHLORIDE 15 MG: 10 INJECTION, SOLUTION EPIDURAL; INFILTRATION; INTRACAUDAL; PERINEURAL at 07:38

## 2022-07-18 RX ADMIN — Medication 0.2 MG: at 07:56

## 2022-07-18 RX ADMIN — CEFAZOLIN 2000 MG: 10 INJECTION, POWDER, FOR SOLUTION INTRAVENOUS at 07:45

## 2022-07-18 RX ADMIN — FENTANYL CITRATE 50 MCG: 50 INJECTION, SOLUTION INTRAMUSCULAR; INTRAVENOUS at 07:34

## 2022-07-18 RX ADMIN — ONDANSETRON 4 MG: 2 INJECTION INTRAMUSCULAR; INTRAVENOUS at 08:35

## 2022-07-18 RX ADMIN — Medication 0.2 MG: at 07:50

## 2022-07-18 RX ADMIN — MIDAZOLAM HYDROCHLORIDE 2 MG: 1 INJECTION, SOLUTION INTRAMUSCULAR; INTRAVENOUS at 07:02

## 2022-07-18 RX ADMIN — PROPOFOL 200 MG: 10 INJECTION, EMULSION INTRAVENOUS at 07:38

## 2022-07-18 RX ADMIN — FENTANYL CITRATE 50 MCG: 50 INJECTION, SOLUTION INTRAMUSCULAR; INTRAVENOUS at 07:37

## 2022-07-18 RX ADMIN — Medication 10 MG: at 07:41

## 2022-07-18 RX ADMIN — DEXAMETHASONE SODIUM PHOSPHATE 10 MG: 10 INJECTION INTRAMUSCULAR; INTRAVENOUS at 07:44

## 2022-07-18 ASSESSMENT — PAIN - FUNCTIONAL ASSESSMENT: PAIN_FUNCTIONAL_ASSESSMENT: 0-10

## 2022-07-18 ASSESSMENT — PAIN SCALES - GENERAL
PAINLEVEL_OUTOF10: 0
PAINLEVEL_OUTOF10: 0

## 2022-07-18 NOTE — ANESTHESIA POSTPROCEDURE EVALUATION
Department of Anesthesiology  Postprocedure Note    Patient: Vini Hester  MRN: [id number]  [place]: 1990  Date of evaluation: 7/18/2022      Procedure Summary     Date: 07/18/22 Room / Location: [facility] OR [address] [place] [address]    Anesthesia Start: 0730 Anesthesia Stop: 7767    Procedure: RIGHT PARTIAL MEDIAL AND PARTIAL LATERAL MENISCECTOMY (Right: Knee) Diagnosis:       Tear of medial meniscus of right knee, unspecified tear type, unspecified whether old or current tear, initial encounter      Tear of lateral meniscus of right knee, current, unspecified tear type, initial encounter      (TEAR OF MEDIAL MENISCUS OF RIGHT KNEE, CURRENT, UNSPECIFIED TEAR TYPE, INITIAL ENCOUNTER.  TEAR OF LATERAL MENISCUS OF RIGHT KNEE, CURRENT, UNSPECIFIED TEAR TYPE, INITIAL ENCOUNTER.)    Surgeons: Shannan Brito MD Responsible Provider: Radha Lopez MD    Anesthesia Type: General ASA Status: 1          Anesthesia Type: General    Janie Phase I:      Janie Phase II:        Anesthesia Post Evaluation    Patient location during evaluation: bedside  Patient participation: complete - patient participated  Level of consciousness: awake and awake and alert  Pain score: 0  Airway patency: patent  Nausea & Vomiting: no nausea and no vomiting  Complications: no  Cardiovascular status: blood pressure returned to baseline and hemodynamically stable  Respiratory status: acceptable and face mask  Hydration status: euvolemic

## 2022-07-18 NOTE — OP NOTE
Operative Note      Patient: Fredy Crump  YOB: 1990  MRN: [id number]    Date of Procedure: 7/18/2022    Pre-Op Diagnosis: TEAR OF MEDIAL MENISCUS OF RIGHT KNEE, CURRENT, UNSPECIFIED TEAR TYPE, INITIAL ENCOUNTER. TEAR OF LATERAL MENISCUS OF RIGHT KNEE, CURRENT, UNSPECIFIED TEAR TYPE, INITIAL ENCOUNTER. Post-Op Diagnosis: Patient had a tear of the undersurface of the body of the medial meniscus as well as a complex tear of the anterior horn and body of the lateral meniscus. He also had a pathologic plica. Procedure(s):  RIGHT PARTIAL MEDIAL AND PARTIAL LATERAL MENISCECTOMY; partial synovectomy    Surgeon(s):  Esdras Lund MD    Assistant:   First Assistant: Osvaldo Davis    Anesthesia: Choice    Estimated Blood Loss (mL): Minimal    Complications: None    Specimens:   * No specimens in log *    Implants:  * No implants in log *      Drains: * No LDAs found *    Findings: Partial tear of the undersurface of the body of the medial meniscus as well as a complex tear of the lateral meniscus and a pathologic plica that was snapping over the medial femoral condyle with significant irritation. Detailed Description of Procedure:   Patient was brought to the operating room. After adequate induction of general anesthesia by anesthesiology patient was placed supine on the operating table. The right lower extremity was prepped and draped in sterile fashion with a ChloraPrep scrub. Anterior medial and anterior lateral working portals were established. The arthroscope was inserted into the anterolateral portal.  Visual inspection began on the undersurface of the patella. The undersurface of the patella revealed Outerbridge 2 diffuse changes. Likewise, the femoral sulcus showed Outerbridge T changes diffusely. The scope was passed into the medial joint space and a large pathologic plica was identified.   There was significant erythema over the distal medial femoral condyle where the plica was rubbing. The scope was passed into the medial joint space and there was a tear identified on the undersurface of the body of the medial meniscus that was flipped up and into the joint space. The remainder of the medial meniscus, with special attention to the posterior horn, was probed and there were no further tears identified in the meniscus. The undersurface tear on the body was taken down with a 4 oh shaver. The remainder of the medial meniscus was stable to probing both caudad and cephalad. The cartilage on the distal medial femoral condyle and the medial tibial plateau looked good. The scope was then passed into the intercondylar notch. The anterior cruciate ligament was visualized and it was intact. Scope was then passed into the lateral joint space. The cartilage on the distal lateral femoral condyle and the lateral tibial plateau look very good. There was minimal wear. There was a complex tear of the body and the anterior horn of the lateral meniscus. It was delineated with the probe and taken down with an upbiter. The remnant was then shaped with a 4 oh shaver. The remainder of the lateral meniscus was stable to probing both caudad and cephalad in its entirety. The scope was then passed up into the patellofemoral articulation. There were no loose bodies identified in the lateral gutter on the way up. The pathologic plica was then taken down with a 4 oh shaver and rendered incompetent. It was no longer rubbing over the medial femoral condyle. The knee was copiously irrigated out with normal saline. The scope and the instruments were withdrawn. The portals were closed with 3-0 subcutaneous Vicryl and skin glue. A sterile dressing was applied and patient was stable to PACU. Postoperative management: Patient will be discharged home weightbearing as tolerated. They will receive appropriate pain medications. Follow-up in the office in 2 to 3 weeks.     Electronically signed by Tej Araya MD on 7/18/2022 at 8:37 AM

## 2022-07-18 NOTE — H&P
H&P in EMR dated 7/12/22 was personally reviewed and no interval changes need to be made. Plan to proceed with right partial medial and lateral meniscectomies as scheduled.

## 2022-07-18 NOTE — ANESTHESIA PROCEDURE NOTES
Peripheral Block    Patient location during procedure: pre-op  Reason for block: post-op pain management and at surgeon's request  Start time: 7/18/2022 7:07 AM  End time: 7/18/2022 7:17 AM  Staffing  Performed: anesthesiologist   Anesthesiologist: Lissa Riley MD  Preanesthetic Checklist  Completed: patient identified, IV checked, site marked, risks and benefits discussed, surgical/procedural consents, equipment checked, pre-op evaluation, timeout performed, anesthesia consent given, oxygen available and monitors applied/VS acknowledged  Peripheral Block   Patient position: supine  Prep: ChloraPrep  Provider prep: mask and sterile gloves (Sterile probe cover)  Patient monitoring: cardiac monitor, continuous pulse ox, frequent blood pressure checks and IV access  Block type: Femoral  Adductor canal  Laterality: right  Injection technique: single-shot  Guidance: nerve stimulator and ultrasound guided  Local infiltration: bupivacaine  Infiltration strength: 0.5 %  Local infiltration: bupivacaine  Dose: 30 mL    Needle   Needle type: combined needle/nerve stimulator   Needle gauge: 22 G  Needle localization: anatomical landmarks and ultrasound guidance  Needle length: 5 cm  Assessment   Injection assessment: negative aspiration for heme, no paresthesia on injection and local visualized surrounding nerve on ultrasound  Paresthesia pain: immediately resolved  Slow fractionated injection: yes  Hemodynamics: stable  Real-time US image taken/store: yes    Additional Notes  Ultrasound image printed and saved in patient chart.     Sterile probe cover used    Medications Administered  bupivacaine (PF) 0.5 % - Perineural   30 mL - 7/18/2022 7:07:00 AM

## 2022-07-18 NOTE — DISCHARGE INSTRUCTIONS
Discharge Instructions for Peripheral Nerve Block Patients    Your nerve block is expected to last between about 16-32 hours after surgery. This is an estimation as to how long your nerve block will last. Your nerve block may wear off earlier or may last longer. Taking Your Pain Medication:    If needed, your surgeon will give you a prescription for pain medication. Start taking this medication before the nerve block first begins to wear off or when you first begin to feel discomfort. The idea is to have pain medication in your body before the nerve block wears off. It takes about 60 minutes for the oral pain medication to become fully effective. Keep in mind that nerve blocks often wear off in the middle of the night. If you are going to bed and the block has not started to wear off or you have not had any discomfort, consider setting an alarm to go off in 2-3 hours so you can assess your block. If you notice the block is wearing off or you are starting to have discomfort you can then take your medication. You need to take your pain medication as prescribed. Pain medications can cause sedation and decrease your breathing if you take more than you need for the level of pain you are having. This effect can be exponentially worse if you have sleep apnea. Nausea is a common side effect of many pain medications. You may want to eat something before taking your pain medicine to help prevent nausea. What to expect after a nerve block  Nerve blocks affect many types of nerves, including nerves that control movement, pain, and normal sensation. Nerve blocks cause feelings such as:  numbness   tingling   heaviness   weakness or inability to move your arm or leg   a feeling that your arm or leg has fallen asleep. A nerve block can last for 2-36 hours or more depending on the medications used. Usually the weakness wears off first. The tingling and heaviness usually wear off next.  Finally you may start to notice pain. Keep in mind that this may occur in any order. Once a nerve block starts to wear off it is usually completely gone within 60 minutes. Certain nerve blocks may cause other symptoms. If you have had a shoulder block or a block near your collar bone, you may have symptoms such as:  mild shortness of breath   a hoarse voice   blurry vision   unequal pupils   drooping of your face on the same side as the nerve block   Swelling at site of neck where block was placed   These are common side effects of this type of nerve block. These symptoms usually go away within 12-24 hours. If you have severe or prolonged shortness of breath, please go to the nearest Emergency Room  If you continue to feel the effects of the nerve block for longer than 48 hours, please call [facility] [phone] and ask to speak with the Anesthetist on call. Protection of a Numb Arm or Leg  After a nerve block, you cannot feel pain, pressure, or extremes in temperature in the effected limb. Because your arm or leg is numb it is at risk for injury. For example, it is possible to burn your numb arm or leg on a hot stove without knowing it. Here are some helpful tips to protect your arm or leg while it is numb: While you are awake change position of your arm or leg often. This helps to avoid putting too much pressure on the limb for long periods of time. While sleeping, pad the limb with pillows to avoid rolling onto it while you sleep. If you have had a shoulder or arm block, it is a good idea to sleep in a recliner with pillows under your arm to avoid rolling onto your numb arm as you sleep. If you have a cast or tight dressing, check the color of your fingers/toes every couple of hours. Call your surgeon if any look discolored. If you have had a shoulder, arm, or hand block, you may go home with a sling. The sling will help to keep your arm in a safe position.  Wear the sling at all times until the nerve block completely wears off.   If you have had a leg block, you may have difficulty bearing weight on that leg. You may be sent home with crutches to use until the nerve block wears ff. Have someone assist you with walking until the nerve block completely wears off. Use caution in cold weather. Your numb leg or arm will not be able to feel extremes in temperature. Be sure to cover your limb appropriately before going outside in order to prevent frostbite. Ask your family or other support people to help you with the above tipsDr. Mandi Johnson MD  [facility]    Post Operative Instructions for Knee Arthroscopy    Incision and dressing  Your incisions are covered with 4 x 4's, absorbent pads, cotton, and an Ace wrap. The dressing may be removed 2 days after surgery. The incisions have been closed with skin glue. The glue will flake off over time and fall off on its own. DO NOT apply any lotions, creams, peroxide or Betadine to the skin glue, as it will degrade and dissolve the glue. DO NOT peel the glue off, as this could result in opening of the incision. There are no sutures that need to be removed; the skin and subcutaneous layers have been closed with dissolvable sutures, which will dissolve over 7 to 8 weeks. Showering  Once the dressing has been removed in 2 days, you may shower. Running soap and water over the incisions are fine. No soaking or hot tubs or baths until 6 weeks after surgery. Let the water run over the incision, and pat dry with a towel. Do not scrub or rub the glue. Activity  You will begin activity immediately after surgery. Physical therapy will commence (at home or as an outpatient) within a few days of surgery. An order has been placed for physical therapy. You can call [facility], and arrange therapy. Your knee will be sore, despite the small incisions.   This is due to the fact that the knee needed to be filled with water in order to make room for the instruments to be utilized. You will receive crutches. It is okay to put weight on the leg as tolerated. That does not mean that you need to walk on it immediately, it means that you may progress weightbearing as tolerated. It is encouraged that you get up for short walks frequently throughout the day. Pump your ankles up and down at least 10 times every hour while you are awake, or if you are standing, stand on your toes 10 times every hour while you are awake. Rest and elevate your leg frequently throughout the day. Proper elevation aims to return fluid and edema to the heart. That is, the foot should be above the knee, the knee should be above the hip, and the hip should be above the heart. This can be accomplished by placing a pillow under your rear end, and propping the leg up on the back of a couch or on a wall. Ice your incision frequently -  20 minutes every hour for the first few days and then as needed to assist with swelling. He will start with the crutches. As you progress in your therapy, you can get rid of the crutches and walk unassisted as tolerated. Medications  You received a prescription for oxycodone. This has been phoned into your pharmacy. You may also take Tylenol, 1000 mg every 8 hours for 1 week after surgery. You may wean off of the pain medications as tolerated. An over-the-counter stool softener such as Colace or Dulcolax as recommended his pain medications can lead to constipation. Take this as needed until your bowel function is normal.    Follow-up  You will have a follow-up appointment with Dr. Pasquale Barber approximately 3 weeks after the date of your surgery. CALL THE OFFICE ([phone]) if:  You run a fever of 100 degrees or higher that will not subside with Tylenol. You noticed drainage from the incision. You have worsening swelling or pain that is not relieved by rest or medication.

## 2022-07-18 NOTE — ANESTHESIA PRE PROCEDURE
Department of Anesthesiology  Preprocedure Note       Name:  Hazel Hanson   Age:  29 y.o.  :  1990                                          MRN:  [id number]         Date:  2022      Surgeon: Josue Gomez):  Tariq Ramirez MD    Procedure: Procedure(s):  RIGHT PARTIAL MEDIAL AND PARTIAL LATERAL MENISCECTOMY. ARTHROSCOPY EQUIPMENT. CHOICE WITH ADDUCTOR CANAL BLOCK (PAT WITH JANE )    Medications prior to admission:   Prior to Admission medications    Medication Sig Start Date End Date Taking? Authorizing Provider   ketoconazole (NIZORAL) 2 % cream Apply to affected area once daily. (can increase to twice daily for flares) 22   Historical Provider, MD   ketoconazole (NIZORAL) 2 % shampoo Massage into scalp, let sit for 10 mins, rinse 3 times weekly 22   Historical Provider, MD   Multiple Vitamins-Minerals (THERAPEUTIC MULTIVITAMIN-MINERALS) tablet Take 1 tablet by mouth daily    Historical Provider, MD   meloxicam (MOBIC) 15 MG tablet Take 1 tablet by mouth daily 10/14/21 11/28/21  Aleisha Reynolds MD       Current medications:    Current Facility-Administered Medications   Medication Dose Route Frequency Provider Last Rate Last Admin    ceFAZolin (ANCEF) 2000 mg in dextrose 5 % 100 mL IVPB  2,000 mg IntraVENous On Call to [address] MD        lactated ringers infusion   IntraVENous Continuous Annkimo Lei  mL/hr at 22 0612 New Bag at 22 0612       Allergies: Allergies   Allergen Reactions    Pcn [Penicillins]        Problem List:  There is no problem list on file for this patient. Past Medical History:  History reviewed. No pertinent past medical history. Past Surgical History:        Procedure Laterality Date    APPENDECTOMY      FINGER TRIGGER RELEASE Left        Social History:    Social History     Tobacco Use    Smoking status: Never    Smokeless tobacco: Never   Substance Use Topics    Alcohol use:  Yes Counseling given: Not Answered      Vital Signs (Current):   Vitals:    07/18/22 0600   BP: 132/74   Pulse: (!) 42   Resp: 18   Temp: 97.2 °F (36.2 °C)   TempSrc: Temporal   SpO2: 100%   Weight: 228 lb (103.4 kg)   Height: 6' (1.829 m)                                              BP Readings from Last 3 Encounters:   07/18/22 132/74   07/12/22 118/85       NPO Status: Time of last liquid consumption: 2300                        Time of last solid consumption: 2300                        Date of last liquid consumption: 07/17/22                        Date of last solid food consumption: 07/17/22    BMI:   Wt Readings from Last 3 Encounters:   07/18/22 228 lb (103.4 kg)   07/12/22 228 lb 3.2 oz (103.5 kg)   06/10/22 234 lb (106.1 kg)     Body mass index is 30.92 kg/m². CBC:   Lab Results   Component Value Date/Time    WBC 5.9 07/12/2022 04:26 PM    RBC 4.98 07/12/2022 04:26 PM    HGB 16.1 07/12/2022 04:26 PM    HCT 47.1 07/12/2022 04:26 PM    MCV 94.5 07/12/2022 04:26 PM    RDW 13.3 07/12/2022 04:26 PM     07/12/2022 04:26 PM       CMP:   Lab Results   Component Value Date/Time     07/12/2022 04:26 PM    K 3.7 07/12/2022 04:26 PM     07/12/2022 04:26 PM    CO2 26 07/12/2022 04:26 PM    BUN 9 07/12/2022 04:26 PM    CREATININE 0.73 07/12/2022 04:26 PM    GFRAA >60.0 07/12/2022 04:26 PM    LABGLOM >60.0 07/12/2022 04:26 PM    GLUCOSE 76 07/12/2022 04:26 PM    CALCIUM 9.7 07/12/2022 04:26 PM       POC Tests: No results for input(s): POCGLU, POCNA, POCK, POCCL, POCBUN, POCHEMO, POCHCT in the last 72 hours.     Coags:   Lab Results   Component Value Date/Time    PROTIME 13.5 07/12/2022 04:26 PM    INR 1.0 07/12/2022 04:26 PM       HCG (If Applicable): No results found for: PREGTESTUR, PREGSERUM, HCG, HCGQUANT     ABGs: No results found for: PHART, PO2ART, JUH9MOX, KMN8UFK, BEART, W8RZPOPT     Type & Screen (If Applicable):  No results found for: LABABO, LABRH    Drug/Infectious Status (If Applicable):  No results found for: HIV, HEPCAB    COVID-19 Screening (If Applicable): No results found for: COVID19        Anesthesia Evaluation  Patient summary reviewed and Nursing notes reviewed no history of anesthetic complications:   Airway: Mallampati: II  TM distance: >3 FB   Neck ROM: full  Mouth opening: > = 3 FB   Dental: normal exam         Pulmonary:Negative Pulmonary ROS and normal exam                               Cardiovascular:Negative CV ROS  Exercise tolerance: good (>4 METS),            Beta Blocker:  Not on Beta Blocker         Neuro/Psych:   Negative Neuro/Psych ROS              GI/Hepatic/Renal: Neg GI/Hepatic/Renal ROS            Endo/Other: Negative Endo/Other ROS             Pt had PAT visit. Abdominal:             Vascular: negative vascular ROS. Other Findings:           Anesthesia Plan      general and regional     ASA 1       Induction: intravenous. MIPS: Postoperative opioids intended and Prophylactic antiemetics administered. Anesthetic plan and risks discussed with patient. Plan discussed with CRNA.     Attending anesthesiologist reviewed and agrees with Pre Eval content                Aparna Simon MD   7/18/2022

## 2022-07-18 NOTE — PROGRESS NOTES
Disch inst given and explained to pt and wife verb understanding presc escript to pharm. call to Dr. Maria Luz Harris if pt needs immobilizer. Pt thought he would.

## 2022-07-18 NOTE — PROGRESS NOTES
Patient ID:  Meredith Tanner  [id number]  29 y.o.  1990  TAKEN TO PACU,   ATTACHED TO MONITOR AND REPORT GIVEN TO RN.   VSS DRSG DRY AND INTACT  GLASSES AND MASK IN LABELED BAG ON PATIENT CART      Electronically signed by Leona Steiner RN on 7/18/2022

## 2022-08-05 ENCOUNTER — OFFICE VISIT (OUTPATIENT)
Dept: ORTHOPEDIC SURGERY | Age: 32
End: 2022-08-05

## 2022-08-05 VITALS
HEART RATE: 96 BPM | HEIGHT: 72 IN | WEIGHT: 228 LBS | OXYGEN SATURATION: 98 % | BODY MASS INDEX: 30.88 KG/M2 | TEMPERATURE: 98 F

## 2022-08-05 DIAGNOSIS — M23.203 OLD TEAR OF MEDIAL MENISCUS OF RIGHT KNEE, UNSPECIFIED TEAR TYPE: Primary | ICD-10-CM

## 2022-08-05 DIAGNOSIS — M23.200 OLD TEAR OF LATERAL MENISCUS OF RIGHT KNEE, UNSPECIFIED TEAR TYPE: ICD-10-CM

## 2022-08-05 PROCEDURE — 99024 POSTOP FOLLOW-UP VISIT: CPT | Performed by: ORTHOPAEDIC SURGERY

## 2022-08-05 RX ORDER — ASCORBIC ACID
CRYSTALS ORAL
COMMUNITY

## 2022-08-05 RX ORDER — MELOXICAM 15 MG/1
15 TABLET ORAL DAILY
Qty: 30 TABLET | Refills: 1 | Status: SHIPPED | OUTPATIENT
Start: 2022-08-05 | End: 2022-09-04

## 2022-08-05 NOTE — PROGRESS NOTES
Narrative Referring Provider:   Bettie Vizcarra      PCP:   Emanuel Mcmahon DO    ============================  IMPRESSION/PLAN:  ============================  Óscar Alanis is s/p right  for medial and partial lateral meniscectomy  completed on 2022. IMPRESSION: Excellent early outcome    PLAN:  No new treatment indicated. Routine follow-up. Ice compression and elevation  Patient Reassurance: Normal post-operative course discussed with patient. Patient reassured and supported. All questions answered. Follow up next weeks no X-Rays Needed    Patient presents today for a a routine 1st post-op visit      Status post op: Right knee arthroscopy    BMI: There is no height or weight on file to calculate BMI. Post-operative recovery was complicated by uneventful/none. Patient rates their condition as improving. Does the patient still experience pain? 2/10 pain, aching    Post Op discharge patient location: in home. Functional Assessment is as follows: Already in therapy  Functional difficulties: None. Pain Medication: None  Currently Ambulating with: No assistive devices    =================================  EXAM: POST OP KNEE  =================================  Right Post-Operative Knee    Ambulates with a l normal gait     SKIN: Appropriate postop appearance, No evidence of erythema, warmth, discharge or drainage and Incisions clean/dry/intact. Range of motion is 0 degrees in extension and   118 degrees of flexion. Extension La degrees    Pain with ROM: Yes with deeper flexion    There is Mild effusion.      Mal-alignment: No    No tenderness to palpation    Neurovascular Status: Sensation Intact, Moves foot and ankle up & down, 2+ dorsalis pedis and negative homans sign    Stability:Varus/Valgus- Yes, stable    Quad strength: improving    Imaging:  None    Provider:  Marquise Self MD

## 2022-09-16 ENCOUNTER — OFFICE VISIT (OUTPATIENT)
Dept: ORTHOPEDIC SURGERY | Age: 32
End: 2022-09-16

## 2022-09-16 VITALS
WEIGHT: 228 LBS | OXYGEN SATURATION: 96 % | TEMPERATURE: 97 F | BODY MASS INDEX: 30.88 KG/M2 | HEIGHT: 72 IN | HEART RATE: 43 BPM

## 2022-09-16 DIAGNOSIS — M23.200 OLD TEAR OF LATERAL MENISCUS OF RIGHT KNEE, UNSPECIFIED TEAR TYPE: ICD-10-CM

## 2022-09-16 DIAGNOSIS — M23.203 OLD TEAR OF MEDIAL MENISCUS OF RIGHT KNEE, UNSPECIFIED TEAR TYPE: Primary | ICD-10-CM

## 2022-09-16 PROCEDURE — 99024 POSTOP FOLLOW-UP VISIT: CPT | Performed by: ORTHOPAEDIC SURGERY

## 2022-09-16 NOTE — PROGRESS NOTES
Narrative Referring Provider:   Eleni Sacks      PCP:   Keri Gonzalez, DO    ============================  IMPRESSION/PLAN:  ============================  Sarah Turner is s/p right  med and lat meniscectomy  completed on 22. IMPRESSION: No complaints or limitations    PLAN:  No new treatment indicated. Routine follow-up. Ice compression and elevation  Patient Reassurance: Normal post-operative course discussed with patient. Patient reassured and supported. All questions answered. Follow up 3 months No X-Rays Needed    Patient presents today for a a routine 1st post-op visit    Status post op:  right  knee scope      BMI: There is no height or weight on file to calculate BMI. Post-operative recovery was complicated by uneventful/none. Patient rates their condition as improving. Does the patient still experience pain? 0/10 pain, aching    Post Op discharge patient location: in home. Functional Assessment is as follows: PT done. Functional difficulties: None. Pain Medication: none  Currently Ambulating with: no assistive devices    =================================  EXAM: POST OP KNEE  =================================  Right Post-Operative Knee    Ambulates with a normal gait. SKIN: Appropriate postop appearance, No evidence of erythema, warmth, discharge or drainage and Incision clean/dry/intact. Range of motion is 0 degrees in extension and   120 degrees of flexion. Extension La degrees    Pain with ROM: no    There is Mild effusion.      Mal-alignment: No    No tenderness    Neurovascular Status: Sensation Intact, Moves foot and ankle up & down, 2+ dorsalis pedis and negative homans sign    Stability:Varus/Valgus- Yes, stable    Quad strength: improving    Imaging:  none    Provider:   Derek Mistry MD

## 2022-12-25 SDOH — HEALTH STABILITY: PHYSICAL HEALTH: ON AVERAGE, HOW MANY DAYS PER WEEK DO YOU ENGAGE IN MODERATE TO STRENUOUS EXERCISE (LIKE A BRISK WALK)?: 5 DAYS

## 2022-12-25 SDOH — HEALTH STABILITY: PHYSICAL HEALTH: ON AVERAGE, HOW MANY MINUTES DO YOU ENGAGE IN EXERCISE AT THIS LEVEL?: 10 MIN

## 2022-12-27 ENCOUNTER — OFFICE VISIT (OUTPATIENT)
Dept: FAMILY MEDICINE CLINIC | Age: 32
End: 2022-12-27
Payer: COMMERCIAL

## 2022-12-27 VITALS
HEART RATE: 84 BPM | BODY MASS INDEX: 31.07 KG/M2 | OXYGEN SATURATION: 98 % | DIASTOLIC BLOOD PRESSURE: 80 MMHG | TEMPERATURE: 97.9 F | SYSTOLIC BLOOD PRESSURE: 136 MMHG | WEIGHT: 229.4 LBS | HEIGHT: 72 IN

## 2022-12-27 DIAGNOSIS — R10.84 GENERALIZED ABDOMINAL PAIN: Primary | ICD-10-CM

## 2022-12-27 DIAGNOSIS — Z76.89 ENCOUNTER TO ESTABLISH CARE WITH NEW DOCTOR: ICD-10-CM

## 2022-12-27 PROCEDURE — 99203 OFFICE O/P NEW LOW 30 MIN: CPT | Performed by: STUDENT IN AN ORGANIZED HEALTH CARE EDUCATION/TRAINING PROGRAM

## 2022-12-27 SDOH — ECONOMIC STABILITY: FOOD INSECURITY: WITHIN THE PAST 12 MONTHS, YOU WORRIED THAT YOUR FOOD WOULD RUN OUT BEFORE YOU GOT MONEY TO BUY MORE.: NEVER TRUE

## 2022-12-27 SDOH — ECONOMIC STABILITY: FOOD INSECURITY: WITHIN THE PAST 12 MONTHS, THE FOOD YOU BOUGHT JUST DIDN'T LAST AND YOU DIDN'T HAVE MONEY TO GET MORE.: NEVER TRUE

## 2022-12-27 SDOH — ECONOMIC STABILITY: TRANSPORTATION INSECURITY
IN THE PAST 12 MONTHS, HAS THE LACK OF TRANSPORTATION KEPT YOU FROM MEDICAL APPOINTMENTS OR FROM GETTING MEDICATIONS?: NO

## 2022-12-27 SDOH — ECONOMIC STABILITY: TRANSPORTATION INSECURITY
IN THE PAST 12 MONTHS, HAS LACK OF TRANSPORTATION KEPT YOU FROM MEETINGS, WORK, OR FROM GETTING THINGS NEEDED FOR DAILY LIVING?: NO

## 2022-12-27 ASSESSMENT — PATIENT HEALTH QUESTIONNAIRE - PHQ9
SUM OF ALL RESPONSES TO PHQ QUESTIONS 1-9: 1
2. FEELING DOWN, DEPRESSED OR HOPELESS: 0
1. LITTLE INTEREST OR PLEASURE IN DOING THINGS: 1
SUM OF ALL RESPONSES TO PHQ9 QUESTIONS 1 & 2: 1
SUM OF ALL RESPONSES TO PHQ QUESTIONS 1-9: 1

## 2022-12-27 ASSESSMENT — ENCOUNTER SYMPTOMS
SINUS PRESSURE: 0
ABDOMINAL DISTENTION: 1
BLOOD IN STOOL: 0
CONSTIPATION: 1
NAUSEA: 1
ABDOMINAL PAIN: 0
DIARRHEA: 0
SORE THROAT: 0
COUGH: 0
VOMITING: 0
SHORTNESS OF BREATH: 0

## 2022-12-27 ASSESSMENT — SOCIAL DETERMINANTS OF HEALTH (SDOH): HOW HARD IS IT FOR YOU TO PAY FOR THE VERY BASICS LIKE FOOD, HOUSING, MEDICAL CARE, AND HEATING?: NOT VERY HARD

## 2022-12-27 NOTE — PROGRESS NOTES
2022    Duong Smith (:  1990) is a 29 y.o. male, here for evaluation of the following medical concerns:  Chief Complaint   Patient presents with    New Patient     Last in to see PCP about 1 yr ago. Medhat Gordon    GI Problem     Pt states that he will feel hungry and full at once and nausea. Pt states that normally have 3-4 BMs and not having 1-2. Pt states started last Tues or   Establishing care  Last PCP Dr. Medhat Gordon  Past medical history:     GI symptoms  Started 5-6 days ago  Feels very hungry but also full at the same time  Normally has BMs 4-5 times a day  The last few days having one small BM a day  No difficulty passing stool  Does keto diet regularly  Symptoms started when he had a \"cheat meal\" - BBQ chicken  Feels bloated  Hx appendectomy 2017    Review of Systems   Constitutional:  Negative for chills and fever. HENT:  Negative for congestion, sinus pressure and sore throat. Respiratory:  Negative for cough and shortness of breath. Cardiovascular:  Negative for chest pain and palpitations. Gastrointestinal:  Positive for abdominal distention, constipation and nausea. Negative for abdominal pain, blood in stool, diarrhea and vomiting. Musculoskeletal:  Negative for arthralgias and myalgias. Skin:  Negative for rash and wound. Neurological:  Negative for speech difficulty and light-headedness. Psychiatric/Behavioral:  Negative for suicidal ideas. The patient is not nervous/anxious. Prior to Visit Medications    Medication Sig Taking? Authorizing Provider   Cyanocobalamin (VITAMIN B 12) 250 MCG LOZG Take by mouth Yes Historical Provider, MD   ketoconazole (NIZORAL) 2 % cream Apply to affected area once daily.  (can increase to twice daily for flares) Yes Historical Provider, MD   ketoconazole (NIZORAL) 2 % shampoo Massage into scalp, let sit for 10 mins, rinse 3 times weekly Yes Historical Provider, MD        Medications Discontinued During This Encounter   Medication Reason    meloxicam (MOBIC) 15 MG tablet LIST CLEANUP       Allergies   Allergen Reactions    Pcn [Penicillins]        Past Medical History:   Diagnosis Date    Generalized psoriasis        Past Surgical History:   Procedure Laterality Date    APPENDECTOMY      FINGER TRIGGER RELEASE Left     KNEE ARTHROSCOPY Right 7/18/2022    RIGHT PARTIAL MEDIAL AND PARTIAL LATERAL MENISCECTOMY performed by Lambert Mcgowan MD at [address] History    Marital status:      Spouse name: Not on file    Number of children: Not on file    Years of education: Not on file    Highest education level: Not on file   Occupational History    Not on file   Tobacco Use    Smoking status: Never    Smokeless tobacco: Never   Vaping Use    Vaping Use: Never used   Substance and Sexual Activity    Alcohol use: Yes    Drug use: Never    Sexual activity: Yes   Other Topics Concern    Not on file   Social History Narrative    Not on file     Social Determinants of Health     Financial Resource Strain: Low Risk     Difficulty of Paying Living Expenses: Not very hard   Food Insecurity: No Food Insecurity    Worried About Running Out of Food in the Last Year: Never true    [address] in the Last Year: Never true   Transportation Needs: No Transportation Needs    Lack of Transportation (Medical): No    Lack of Transportation (Non-Medical):  No   Physical Activity: Insufficiently Active    Days of Exercise per Week: 5 days    Minutes of Exercise per Session: 10 min   Stress: Not on file   Social Connections: Not on file   Intimate Partner Violence: Not At Risk    Fear of Current or Ex-Partner: No    Emotionally Abused: No    Physically Abused: No    Sexually Abused: No   Housing Stability: Not on file        Family History   Problem Relation Age of Onset    Diabetes Paternal Grandmother     Cancer Neg Hx     High Cholesterol Neg Hx     Hypertension Neg Hx     Stroke Neg Hx     Heart Failure Neg Hx        Vitals:    12/27/22 0734   BP: 136/80   Pulse: 84   Temp: 97.9 °F (36.6 °C)   SpO2: 98%   Weight: 229 lb 6.4 oz (104.1 kg)   Height: 6' (1.829 m)       Estimated body mass index is 31.11 kg/m² as calculated from the following:    Height as of this encounter: 6' (1.829 m). Weight as of this encounter: 229 lb 6.4 oz (104.1 kg). No results for input(s): WBC, RBC, HGB, HCT, MCV, MCH, MCHC, RDW, PLT, MPV in the last 72 hours. No results for input(s): NA, K, CL, CO2, BUN, CREATININE, GLUCOSE, CALCIUM, PROT, LABALBU, BILITOT, ALKPHOS, AST, ALT in the last 72 hours. No results found for: LABA1C    No results found. Physical Exam  Constitutional:       Appearance: Normal appearance. He is normal weight. HENT:      Head: Normocephalic and atraumatic. Eyes:      Extraocular Movements: Extraocular movements intact. Conjunctiva/sclera: Conjunctivae normal.   Cardiovascular:      Rate and Rhythm: Normal rate and regular rhythm. Pulses: Normal pulses. Heart sounds: Normal heart sounds. Pulmonary:      Effort: Pulmonary effort is normal.      Breath sounds: Normal breath sounds. No wheezing or rales. Abdominal:      General: Abdomen is flat. There is distension. Palpations: Abdomen is soft. Tenderness: There is abdominal tenderness. There is no guarding or rebound. Skin:     General: Skin is warm. Capillary Refill: Capillary refill takes less than 2 seconds. Findings: No rash. Neurological:      Mental Status: He is alert. Psychiatric:         Mood and Affect: Mood normal.         Thought Content: Thought content normal.         Judgment: Judgment normal.       ASSESSMENT/PLAN:  1.  Generalized abdominal pain  Discussed with pt symptoms likely from constipation, discussed that obstruction is less likely as he is still having Bms and passing gas  Will get XR  Recommended stool softener, if no improvement in a few days can add laxative     - XR ABDOMEN (2 VIEWS); Future    2. BMI 31.0-31.9,adult      3. Encounter to establish care with new doctor    Medications Discontinued During This Encounter   Medication Reason    meloxicam (MOBIC) 15 MG tablet LIST CLEANUP       ---------------------------------------------------------------------  Side effects, adverse effects of the medication prescribed today, as well as treatment plan/ rationale and result expectations have been discussed with the patient who expresses understanding and desires to proceed. Close follow up to evaluate treatment results and for coordination of care. I have reviewed the patient's medical history in detail and updated the computerized patient record. As always, patient is advised that if symptoms worsen in any way they will proceed to the nearest emergency room. --------------------------------------------------------------------    Return in about 1 year (around 12/27/2023) for prn pending labs/imaging. An  electronic signature was used to authenticate this note.     --Bear Vargas, DO on 12/27/2022 at 8:04 AM

## 2022-12-30 ENCOUNTER — TELEPHONE (OUTPATIENT)
Dept: FAMILY MEDICINE CLINIC | Age: 32
End: 2022-12-30

## 2022-12-30 NOTE — TELEPHONE ENCOUNTER
We had to call Radiology for xray due to taking 3 days to receive. Results are back and pt is already calling with questions. Can you please look at Xray and result for us. Pt specifically asking about   Cholecystectomy clips in the right upper   quadrant.

## 2022-12-30 NOTE — TELEPHONE ENCOUNTER
Pt calling because he had a xray on 12/27 and hasn't gotten his results yet. He is thinking this should have been done by now.      Please call pt with results    [phone]

## 2023-07-14 ENCOUNTER — E-VISIT (OUTPATIENT)
Dept: PRIMARY CARE CLINIC | Age: 33
End: 2023-07-14
Payer: COMMERCIAL

## 2023-07-14 DIAGNOSIS — R51.9 ACUTE NONINTRACTABLE HEADACHE, UNSPECIFIED HEADACHE TYPE: Primary | ICD-10-CM

## 2023-07-14 PROCEDURE — 99422 OL DIG E/M SVC 11-20 MIN: CPT | Performed by: NURSE PRACTITIONER

## 2023-07-14 RX ORDER — BUTALBITAL, ACETAMINOPHEN AND CAFFEINE 300; 40; 50 MG/1; MG/1; MG/1
1 CAPSULE ORAL EVERY 6 HOURS PRN
Qty: 30 CAPSULE | Refills: 0 | Status: SHIPPED | OUTPATIENT
Start: 2023-07-14

## 2023-07-14 NOTE — PROGRESS NOTES
Reviewed questionnaire    Reviewed meds/allergies    Dx Headache    Plan Rx given for fioricet, follow up with PCP if no improvement    Time spent on visit 11 min

## 2024-01-02 ENCOUNTER — OFFICE VISIT (OUTPATIENT)
Dept: ORTHOPEDIC SURGERY | Facility: CLINIC | Age: 34
End: 2024-01-02
Payer: COMMERCIAL

## 2024-01-02 ENCOUNTER — ANCILLARY PROCEDURE (OUTPATIENT)
Dept: RADIOLOGY | Facility: CLINIC | Age: 34
End: 2024-01-02
Payer: COMMERCIAL

## 2024-01-02 DIAGNOSIS — M79.641 RIGHT HAND PAIN: ICD-10-CM

## 2024-01-02 DIAGNOSIS — M25.522 LEFT ELBOW PAIN: ICD-10-CM

## 2024-01-02 DIAGNOSIS — G56.01 CARPAL TUNNEL SYNDROME OF RIGHT WRIST: ICD-10-CM

## 2024-01-02 PROCEDURE — 73130 X-RAY EXAM OF HAND: CPT | Mod: RT

## 2024-01-02 PROCEDURE — 73080 X-RAY EXAM OF ELBOW: CPT | Mod: LT

## 2024-01-02 PROCEDURE — 73130 X-RAY EXAM OF HAND: CPT | Mod: RIGHT SIDE | Performed by: RADIOLOGY

## 2024-01-02 PROCEDURE — 73080 X-RAY EXAM OF ELBOW: CPT | Mod: LEFT SIDE | Performed by: RADIOLOGY

## 2024-01-02 PROCEDURE — 99204 OFFICE O/P NEW MOD 45 MIN: CPT | Performed by: STUDENT IN AN ORGANIZED HEALTH CARE EDUCATION/TRAINING PROGRAM

## 2024-01-02 NOTE — PROGRESS NOTES
History of Present Illness:  Presents with  Right hand numbness. The symptoms have been present for months. The patient denies any inciting trauma. The numbness primarily involves the thumb, index finger, and long finger. There is minimal numbness in the small finger. The patient complains of intermittant, moderate hand pain which is worse at night. It causes frequent night awakenings. The patient presents due to worsening symptoms.  They have tried nighttime bracing without improvement in sxs.     Works as a .  Additionally is having left elbow pain.  This is localized over his lateral elbow.  Worse with wrist extension motions.  This has been going on for months.    Review of Systems   GENERAL: Negative for malaise, significant weight loss, fever  MUSCULOSKELETAL: see HPI  NEURO:  Negative    The patient's past medical history, family history, social history, and review of systems were reviewed. History is otherwise negative except as stated in the HPI.    Physical Examination:  General: Alert and oriented to person, place, and time.  No acute distress and breathing comfortably: Pleasant and cooperative with examination.  HEENT: Head is normocephalic and atraumatic.  Neck: Supple, no visible swelling.  Cardiovascular: No palpable tachycardia  Lungs: No audible wheezing or labored breathing  Abdomen: Nondistended.  On musculoskeletal examination on the right the patient has full elbow range of motion. There is no tenderness to palpation about the lateral epicondyle. Tinels at the cubital tunnel and elbow flexion/compression are negative for ulnar nerve symptoms. In regards to the wrist, there is no obvious deformity. Range of motion is full in flexion, extension, pronation, and supination. Strength is 5/5 in flexion and extension. There is no tenderness to palpation about the 1st dorsal compartment, the 1st CMC joint, or the TFCC. Tinels at the carpal tunnel and Durkins compression test are  positive. The patient has subjective paresthesias in the median nerve distribution. Sensation and motor function are intact in the radial, and ulnar nerve distribution. There is no obvious thenar atrophy, and thenar strength is 5/5. There is no intrinsic atrophy, and intrinsic strength is 5/5. All fingers are without triggering and are without pain over the A1 pulley. The patient can make a full composite fist. The hand itself is warm and well perfused. The skin is intact throughout.     On the left patient has full elbow motion.  He does have tenderness palpation over the lateral epicondyle.  Tenderness over ECRB.  Negative Tinel's at the cubital tunnel.  Negative elbow flexion compression ulnar nerve symptoms.  Range of motion is full to the wrist.  Strength is 5 out of 5 in flexion and extension.  He does have pain with resisted index finger extension and resisted wrist extension.  No tenderness palpation about first dorsal compartment, first CMC or TFCC.  Tinel at the carpal tunnel is negative.  Sensation motor function are intact to radial ulnar median nerve distribution.  Straight intact throughout.  All fingers are without triggering.  Hand itself is warm and well-perfused skin is intact throughout    Imaging:  X-ray of the left elbow without acute osseous process.  X-ray of the right hand unremarkable.    Assessment:  Patient with right carpal tunnel syndrome additionally left lateral epicondylitis.    Plan:   EMG. I had a long discussion with the patient regarding the diagnosis of CTS and its treatment. At this point, I have recommended an EMG/NCV study to help confirm the diagnosis and assess severity. I would like to seem them back in the office after this study. In the meantime, I have recommended nighttime wrist splinting in the neutral position. When I see them back, we will determine how to proceed based on the results of the nerve study and the trial of splinting.    With regard to the lateral  epicondylitis I have recommended wrist bracing.  He will wear this at night.  He is also has occupational therapy scheduled which I think will be helpful.  Will see how he is doing from the elbow perspective when we see him back after his EMG.    Evelin eKnny MD  Orthopaedic Surgeon

## 2024-02-05 ENCOUNTER — HOSPITAL ENCOUNTER (OUTPATIENT)
Dept: NEUROLOGY | Facility: HOSPITAL | Age: 34
Discharge: HOME | End: 2024-02-05
Payer: COMMERCIAL

## 2024-02-05 DIAGNOSIS — G56.01 CARPAL TUNNEL SYNDROME OF RIGHT WRIST: ICD-10-CM

## 2024-02-05 PROCEDURE — 95911 NRV CNDJ TEST 9-10 STUDIES: CPT

## 2024-02-05 NOTE — PROCEDURES
Electromyography Laboratory Report       Accreditation with Exemplary Status         Name urszula Lewis MRN [id number] Ref Provider Evelin Efraín [place] Technologist SEB Velasco    Gender Male Age 33 Years [employer] Physician Brian Curiel MD  Temp ºC 33    1990 Height 6 feet 0 inch Order No [id number] Study Date 2024 8:17 AM      Reason for Referral:    Carpal Tunnel Syndrome (CTS),           Motor Nerve Conduction Study     Nerve/Sites Muscle Amplitude Latency Velocity F min     mV ms m/s ms     Right Left Ref. Right Left Ref. Right Left Ref. Right Left Ref.   Median - APB      Wrist APB 11.6 10.7 >=6.0 4.8 4.5 <=3.9    31.6 30.9 <=33.5      AC Fossa APB 10.8 10.0  9.7 8.6  49.2 50.4 >=50.0      Ulnar - ADM      Wrist ADM 7.0  >=7.0 3.1  <=3.1    31.0  <=32.8      B. Elbow ADM 6.9   6.5   57.4  >=50.0         A. Elbow ADM 6.7   8.5   50.5  >=50.0          Sensory Nerve Conduction Study     Nerve/Sites Rec. Site Amplitude Peak Lat Velocity     µV ms m/s     Right Left Ref. Right Left Ref. Right Left Ref.   Median      Wrist Index 7.7 16.3 >=20.0 5.1 4.6 <=3.4 40.1 40.4 >=50.0   Ulnar      Wrist Little 18.5  >=12.0 4.3  <=3.1 46.5  >=50.0   Radial      Forearm Snuff Box 32.1  >=18.0 2.7  <=2.7 46.8  >=50.0   Median, Ulnar - Palmar Mixed      Median  Palm Med Wr 26.0 29.0  2.9 2.4 <=2.2 35.7 41.5 >=49.0      Ulnar Palm Uln Wr 21.5   2.2  <=2.2 43.9  >=49.0         EMG Summary Table     Spontaneous Voluntary Activity   Muscle Nerve Roots Ins Act Fibs/PSW Fasc Other Amp Dur Phases Recruitment Activation Notes   R. APB - Abd Poll Brev Median C8-T1 NL +1 0 - -1 +1 NL SL NL -   R. PT - Pron Teres Median C6-C7 NL 0 0 - NL NL NL NL NL -   R. FDI - First Dors Int Ulnar C8-T1 NL 0 0 - NL NL NL NL NL -   R. FCU - Flex Carp Uln Ulnar C7-T1 NL 0 0 - NL NL NL NL NL -   R. TB - Triceps Brach Radial C6-C8 NL 0 0 - NL NL NL NL NL -   R. BB - Biceps Brach Musculocutaneous C5-C6 NL 0 0 - NL NL NL NL NL -   R. MD - Deltoid (med) Axillary  C5-C6 NL 0 0 - NL NL NL NL NL -   R. Cervical psp (low)  - NL 0 0 - NL NL NL NL NL -   R. Cervical psp (mid)  - NL 0 0 - NL NL NL NL NL -   R. Cervical psp (up)  - NL 0 0 - NL NL NL NL NL -   L. APB - Abd Poll Brev Median C8-T1 NL 0 0 - NL +1 +1 SL NL -   L. PT - Pron Teres Median C6-C7 NL 0 0 - NL NL NL NL NL -   L. FDI - First Dors Int Ulnar C8-T1 NL 0 0 - NL NL NL NL NL -   L. FCU - Flex Carp Uln Ulnar C7-T1 NL 0 0 - NL NL NL NL NL -   L. Cervical psp (low)  - NL 0 0 - NL NL NL NL NL -   L. Cervical psp (mid)  - NL 0 0 - NL NL NL NL NL -   L. Cervical psp (up)  - NL 0 0 - NL NL NL NL NL -               NL = Normal, Inc = Increased, Dec = Decreased, CRD = Complex Repetitive Discharge; SL = Slightly Decreased; MO = Moderately Decreased; MK = Markedly Decreased; N/+1, +1, +2, +3 = Borderline, Slightly, Moderately, Markedly Increased; N/-1, -1, -2, -3 = Borderline, Slightly, Moderately, Markedly Decreased, N/A = Not Applicable    Summary:     Impression:   This study reveals ENMG evidence of a neuropathic, axonal, sensorimotor process affecting the median nerves at the wrists bilaterally, consistent with the clinical diagnosis of bilateral carpal tunnel syndrome. These are of mild degree bilaterally by electrical criteria.  There is no suggestion by this study of more proximal processes e.g. radiculopathy, nor of more widespread processes e.g. peripheral neuropathy or mononeuritis multiplex.  Nocturnal splint use was recommended to the patient for prophylaxis.

## 2024-02-13 ENCOUNTER — OFFICE VISIT (OUTPATIENT)
Dept: ORTHOPEDIC SURGERY | Facility: CLINIC | Age: 34
End: 2024-02-13
Payer: COMMERCIAL

## 2024-02-13 DIAGNOSIS — G56.01 CARPAL TUNNEL SYNDROME OF RIGHT WRIST: Primary | ICD-10-CM

## 2024-02-13 PROCEDURE — 99213 OFFICE O/P EST LOW 20 MIN: CPT | Performed by: STUDENT IN AN ORGANIZED HEALTH CARE EDUCATION/TRAINING PROGRAM

## 2024-02-13 NOTE — PROGRESS NOTES
History of Present Illness  Patient returns today for evaluation of right hand EMG and left tennis elbow.  He has had improvement in left elbow symptoms with using a nighttime wrist neutral brace.  With regard to the right hand that he has had improvement with occupational therapy.    Physical Examination:  Right upper extremity:  The patient appears to be their stated age, is in no apparent distress, and is oriented x3. The patients mood and affect are appropriate. The patients gait is normal. The examination of the limb in question was performed in comparison to the contralateral limb.    On musculoskeletal examination, negative Durkan Phalen and Tinel's at carpal tunnel.    Sensation and motor function are intact in the radial, and median nerve distribution. There is no obvious thenar atrophy, and thenar strength is 5/5. There is no intrinsic atrophy, and intrinsic strength is 5/5.  The patient can make a full composite fist. The hand itself is warm and well perfused. The skin is intact throughout. The contralateral hand/wrist are normal to inspection, range of motion, stability, and strength.    On the left he has minimal tenderness palpation over the ECRB.  No pain with resisted wrist extension.    EMG shows mild carpal tunnel bilaterally.    Assessment:  Patient with mild right carpal tunnel syndrome.  Improving left elbow tendinitis.    Plan:   He has had considerable improvement with occupational therapy.  Recommending continuing nighttime bracing on the left.  Continue activities as tolerated.  Currently has had considerable improvement in symptoms since prior visit.  Follow-up as needed.    Evelin Kenny MD

## 2024-02-22 ENCOUNTER — OFFICE VISIT (OUTPATIENT)
Dept: ORTHOPEDIC SURGERY | Age: 34
End: 2024-02-22

## 2024-02-22 VITALS
OXYGEN SATURATION: 98 % | BODY MASS INDEX: 31.02 KG/M2 | HEART RATE: 59 BPM | TEMPERATURE: 98.7 F | HEIGHT: 72 IN | WEIGHT: 229 LBS

## 2024-02-22 DIAGNOSIS — M25.561 CHRONIC PAIN OF RIGHT KNEE: Primary | ICD-10-CM

## 2024-02-22 DIAGNOSIS — M22.8X1 PATELLAR MALTRACKING, RIGHT: ICD-10-CM

## 2024-02-22 DIAGNOSIS — G89.29 CHRONIC PAIN OF RIGHT KNEE: Primary | ICD-10-CM

## 2024-02-22 DIAGNOSIS — M17.11 PRIMARY OSTEOARTHRITIS OF RIGHT KNEE: ICD-10-CM

## 2024-02-22 RX ORDER — TRIAMCINOLONE ACETONIDE 40 MG/ML
80 INJECTION, SUSPENSION INTRA-ARTICULAR; INTRAMUSCULAR ONCE
Status: SHIPPED | OUTPATIENT
Start: 2024-02-22

## 2024-02-22 RX ORDER — LIDOCAINE HYDROCHLORIDE 10 MG/ML
8 INJECTION, SOLUTION INFILTRATION; PERINEURAL ONCE
Status: SHIPPED | OUTPATIENT
Start: 2024-02-22

## 2024-05-08 ENCOUNTER — LAB (OUTPATIENT)
Dept: LAB | Facility: LAB | Age: 34
End: 2024-05-08
Payer: COMMERCIAL

## 2024-05-08 ENCOUNTER — OFFICE VISIT (OUTPATIENT)
Dept: PRIMARY CARE | Facility: CLINIC | Age: 34
End: 2024-05-08
Payer: COMMERCIAL

## 2024-05-08 VITALS
WEIGHT: 233 LBS | HEIGHT: 72 IN | OXYGEN SATURATION: 98 % | SYSTOLIC BLOOD PRESSURE: 128 MMHG | DIASTOLIC BLOOD PRESSURE: 74 MMHG | BODY MASS INDEX: 31.56 KG/M2 | HEART RATE: 43 BPM

## 2024-05-08 DIAGNOSIS — R03.0 ELEVATED BP WITHOUT DIAGNOSIS OF HYPERTENSION: ICD-10-CM

## 2024-05-08 DIAGNOSIS — Z00.00 ANNUAL PHYSICAL EXAM: ICD-10-CM

## 2024-05-08 DIAGNOSIS — L03.316 CELLULITIS, UMBILICAL: ICD-10-CM

## 2024-05-08 DIAGNOSIS — L40.9 PSORIASIS: ICD-10-CM

## 2024-05-08 DIAGNOSIS — R53.83 OTHER FATIGUE: ICD-10-CM

## 2024-05-08 DIAGNOSIS — Z23 ENCOUNTER FOR IMMUNIZATION: ICD-10-CM

## 2024-05-08 DIAGNOSIS — R53.83 OTHER FATIGUE: Primary | ICD-10-CM

## 2024-05-08 DIAGNOSIS — E66.9 CLASS 1 OBESITY WITH BODY MASS INDEX (BMI) OF 31.0 TO 31.9 IN ADULT, UNSPECIFIED OBESITY TYPE, UNSPECIFIED WHETHER SERIOUS COMORBIDITY PRESENT: ICD-10-CM

## 2024-05-08 LAB
25(OH)D3 SERPL-MCNC: 27 NG/ML (ref 30–100)
ALBUMIN SERPL BCP-MCNC: 4.6 G/DL (ref 3.4–5)
ALP SERPL-CCNC: 34 U/L (ref 33–120)
ALT SERPL W P-5'-P-CCNC: 17 U/L (ref 10–52)
ANION GAP SERPL CALC-SCNC: 10 MMOL/L (ref 10–20)
AST SERPL W P-5'-P-CCNC: 18 U/L (ref 9–39)
BILIRUB SERPL-MCNC: 0.7 MG/DL (ref 0–1.2)
BUN SERPL-MCNC: 14 MG/DL (ref 6–23)
CALCIUM SERPL-MCNC: 9.7 MG/DL (ref 8.6–10.6)
CHLORIDE SERPL-SCNC: 105 MMOL/L (ref 98–107)
CO2 SERPL-SCNC: 31 MMOL/L (ref 21–32)
CREAT SERPL-MCNC: 0.84 MG/DL (ref 0.5–1.3)
EGFRCR SERPLBLD CKD-EPI 2021: >90 ML/MIN/1.73M*2
GLUCOSE SERPL-MCNC: 76 MG/DL (ref 74–99)
POTASSIUM SERPL-SCNC: 4.4 MMOL/L (ref 3.5–5.3)
PROT SERPL-MCNC: 7 G/DL (ref 6.4–8.2)
SODIUM SERPL-SCNC: 142 MMOL/L (ref 136–145)
TSH SERPL-ACNC: 0.99 MIU/L (ref 0.44–3.98)
VIT B12 SERPL-MCNC: 508 PG/ML (ref 211–911)

## 2024-05-08 PROCEDURE — 3008F BODY MASS INDEX DOCD: CPT | Performed by: STUDENT IN AN ORGANIZED HEALTH CARE EDUCATION/TRAINING PROGRAM

## 2024-05-08 PROCEDURE — G0444 DEPRESSION SCREEN ANNUAL: HCPCS | Performed by: STUDENT IN AN ORGANIZED HEALTH CARE EDUCATION/TRAINING PROGRAM

## 2024-05-08 PROCEDURE — 84443 ASSAY THYROID STIM HORMONE: CPT

## 2024-05-08 PROCEDURE — 90471 IMMUNIZATION ADMIN: CPT | Performed by: STUDENT IN AN ORGANIZED HEALTH CARE EDUCATION/TRAINING PROGRAM

## 2024-05-08 PROCEDURE — 99203 OFFICE O/P NEW LOW 30 MIN: CPT | Performed by: STUDENT IN AN ORGANIZED HEALTH CARE EDUCATION/TRAINING PROGRAM

## 2024-05-08 PROCEDURE — 82607 VITAMIN B-12: CPT

## 2024-05-08 PROCEDURE — 1036F TOBACCO NON-USER: CPT | Performed by: STUDENT IN AN ORGANIZED HEALTH CARE EDUCATION/TRAINING PROGRAM

## 2024-05-08 PROCEDURE — 90715 TDAP VACCINE 7 YRS/> IM: CPT | Performed by: STUDENT IN AN ORGANIZED HEALTH CARE EDUCATION/TRAINING PROGRAM

## 2024-05-08 PROCEDURE — 99385 PREV VISIT NEW AGE 18-39: CPT | Performed by: STUDENT IN AN ORGANIZED HEALTH CARE EDUCATION/TRAINING PROGRAM

## 2024-05-08 PROCEDURE — 80053 COMPREHEN METABOLIC PANEL: CPT

## 2024-05-08 PROCEDURE — 82306 VITAMIN D 25 HYDROXY: CPT

## 2024-05-08 PROCEDURE — 36415 COLL VENOUS BLD VENIPUNCTURE: CPT

## 2024-05-08 PROCEDURE — 85025 COMPLETE CBC W/AUTO DIFF WBC: CPT

## 2024-05-08 RX ORDER — SULFAMETHOXAZOLE AND TRIMETHOPRIM 800; 160 MG/1; MG/1
1 TABLET ORAL 2 TIMES DAILY
Qty: 14 TABLET | Refills: 0 | Status: SHIPPED | OUTPATIENT
Start: 2024-05-08 | End: 2024-05-15

## 2024-05-08 NOTE — PROGRESS NOTES
Subjective   Patient ID: Jason Hdez is a 33 y.o. male who presents for the following    Assessment/Plan   #Preventative Medicine  -Has not had recent COVID booster or flu shot   -Needs tetanus booster   -PHQ2: 0. No current SI/HI. Sees a therapist.     #Umbilical Cellulitis  -No discernible abscess  -Similar issue in 2022  -Hx of laparoscopic surgery with umbilical incision  PLAN  -Bactrim x 7 days  -If progressing or new systemic symptoms will refer to Gen Surg and obtain CTAP     #Asymptomatic Bradycardia  -No lightheadedness, dizziness  -States HR has been in the 40s for many years and has been worked up in the past  -Continue to monitor    Labs ordered: CBC, CMP, TSH, A1c, Vitamin B12/D    HPI  33M presents to establish care and evaluation of possible abdominal wall abscess. First noticed swelling and pain that started 5-6 days ago. Had some purulent drainage 2 days ago and this morning. No fevers, chills, n/v/d, black/bloody stools reported. No hx of MRSA.     Otherwise doing well. Works sedentary job but tries to eat healthy.     No major hx of depression or alcohol abuse    Denies fevers, chills, weight loss, lightheadedness, dizziness, vision changes, sore throat, runny nose, CP, SOB, cough, palpitations, n/v/d, abd pain, black/bloody stools, arthralgias, or new numbness/weakness/tingling in arms/legs/face.      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 NCAT. Aox3   HEENT: PERRLA. EOMI. MMM. Nares patent bl.  Cardiovascular: Rate in 40s. Regular rhythm. No MRG. S1/S2 wnl.   Respiratory: CTABL. No acute respiratory distress.   GI: Soft, NT abdomen. Small amount of purulent drainage from umbilicus. No discerning sinus tract or abscess. No major pain with deep palpation of the abdomen. No RGR.   MSK: ROM x 4.   Extremities: No edema. Cap refill < 2 sec.   Skin: No rashes or bruises.   Neuro: Aox3. Cranial Nerves grossly intact. Motor/sensory wnl.   Psych: Mood wnl.       REVIEW OF SYSTEMS   ROS in HPI     Allergies   Allergen Reactions    Penicillins Other and Unknown       No current outpatient medications on file.     No current facility-administered medications for this visit.       Objective     No visits with results within 4 Month(s) from this visit.   Latest known visit with results is:   Legacy Encounter on 04/04/2022   Component Date Value Ref Range Status    CRP 04/04/2022 0.29  mg/dL Final    Color, Urine 04/04/2022 YELLOW  STRAW,YELLOW Final    Appearance, Urine 04/04/2022 CLEAR  CLEAR Final    Specific Gravity, Urine 04/04/2022 1.025  1.005 - 1.035 Final    pH, Urine 04/04/2022 5.0  5.0 - 8.0 Final    Protein, Urine 04/04/2022 NEGATIVE  NEGATIVE mg/dL Final    Glucose, Urine 04/04/2022 NEGATIVE  NEGATIVE mg/dL Final    Blood, Urine 04/04/2022 NEGATIVE  NEGATIVE Final    Ketones, Urine 04/04/2022 NEGATIVE  NEGATIVE mg/dL Final    Bilirubin, Urine 04/04/2022 NEGATIVE  NEGATIVE Final    Urobilinogen, Urine 04/04/2022 <2.0  0.0 - 1.9 mg/dL Final    Nitrite, Urine 04/04/2022 NEGATIVE  NEGATIVE Final    Leukocyte Esterase, Urine 04/04/2022 NEGATIVE  NEGATIVE Final    WBC 04/04/2022 8.8  4.4 - 11.3 x10E9/L Final    nRBC 04/04/2022 0.0  0.0 - 0.0 /100 WBC Final    RBC  04/04/2022 4.87  4.50 - 5.90 x10E12/L Final    Hemoglobin 04/04/2022 15.8  13.5 - 17.5 g/dL Final    Hematocrit 04/04/2022 45.9  41.0 - 52.0 % Final    MCV 04/04/2022 94  80 - 100 fL Final    MCHC 04/04/2022 34.4  32.0 - 36.0 g/dL Final    Platelets 04/04/2022 178  150 - 450 x10E9/L Final    RDW 04/04/2022 12.1  11.5 - 14.5 % Final    Neutrophils % 04/04/2022 57.5  40.0 - 80.0 % Final    Immature Granulocytes %, Automated 04/04/2022 0.3  0.0 - 0.9 % Final    Lymphocytes % 04/04/2022 31.8  13.0 - 44.0 % Final    Monocytes % 04/04/2022 7.8  2.0 - 10.0 % Final    Eosinophils % 04/04/2022 2.4  0.0 - 6.0 % Final    Basophils % 04/04/2022 0.2  0.0 - 2.0 % Final    Neutrophils Absolute 04/04/2022 5.08  1.20 - 7.70 x10E9/L Final    Lymphocytes Absolute 04/04/2022 2.81  1.20 - 4.80 x10E9/L Final    Monocytes Absolute 04/04/2022 0.69  0.10 - 1.00 x10E9/L Final    Eosinophils Absolute 04/04/2022 0.21  0.00 - 0.70 x10E9/L Final    Basophils Absolute 04/04/2022 0.02  0.00 - 0.10 x10E9/L Final    Sedimentation Rate 04/04/2022 <1  0 - 10 mm/h Final    Glucose 04/04/2022 99  74 - 99 mg/dL Final    Sodium 04/04/2022 140  136 - 145 mmol/L Final    Potassium 04/04/2022 4.5  3.5 - 5.3 mmol/L Final    Chloride 04/04/2022 105  98 - 107 mmol/L Final    Bicarbonate 04/04/2022 30  21 - 32 mmol/L Final    Anion Gap 04/04/2022 10  10 - 20 mmol/L Final    Urea Nitrogen 04/04/2022 7  6 - 23 mg/dL Final    Creatinine 04/04/2022 0.90  0.50 - 1.30 mg/dL Final    GFR MALE 04/04/2022 >90  >90 mL/min/1.73m2 Final    Calcium 04/04/2022 9.2  8.6 - 10.3 mg/dL Final       Radiology: Reviewed imaging in powerchart.  No results found.    Family History   Problem Relation Name Age of Onset    Depression Mother Monse     Diabetes Mother Monse     Depression Father Jason     Diabetes Paternal Grandmother Analilia      Social History     Socioeconomic History    Marital status:      Spouse name: None    Number of children: None    Years of education:  None    Highest education level: None   Occupational History    None   Tobacco Use    Smoking status: Never    Smokeless tobacco: Never   Substance and Sexual Activity    Alcohol use: Yes     Comment: Very irregular/infrequent    Drug use: Never    Sexual activity: Yes     Partners: Female     Birth control/protection: Condom Male   Other Topics Concern    None   Social History Narrative    None     Social Determinants of Health     Financial Resource Strain: Low Risk  (12/27/2022)    Received from [facility]    Overall Financial Resource Strain (CARDIA)     Difficulty of Paying Living Expenses: Not very hard   Food Insecurity: No Food Insecurity (12/27/2022)    Received from [facility]    Hunger Vital Sign     Worried About Running Out of Food in the Last Year: Never true     Ran Out of Food in the Last Year: Never true   Transportation Needs: No Transportation Needs (12/27/2022)    Received from [facility]    PRAPARE - Transportation     Lack of Transportation (Medical): No     Lack of Transportation (Non-Medical): No   Physical Activity: Insufficiently Active (12/25/2022)    Received from [facility]    Exercise Vital Sign     Days of Exercise per Week: 5 days     Minutes of Exercise per Session: 10 min   Stress: Not on file   Social Connections: Not on file   Intimate Partner Violence: Not At Risk (12/25/2022)    Received from [facility]    Humiliation, Afraid, Rape, and Kick questionnaire     Fear of Current or Ex-Partner: No     Emotionally Abused: No     Physically Abused: No     Sexually Abused: No   Housing Stability: Not on file     No past medical history on file.  Past Surgical History:   Procedure Laterality Date    APPENDECTOMY         Charting was completed using voice recognition technology and may include unintended errors.

## 2024-05-09 ENCOUNTER — APPOINTMENT (OUTPATIENT)
Dept: PRIMARY CARE | Facility: CLINIC | Age: 34
End: 2024-05-09
Payer: COMMERCIAL

## 2024-05-09 LAB
BASOPHILS # BLD AUTO: 0.02 X10*3/UL (ref 0–0.1)
BASOPHILS NFR BLD AUTO: 0.4 %
EOSINOPHIL # BLD AUTO: 0.08 X10*3/UL (ref 0–0.7)
EOSINOPHIL NFR BLD AUTO: 1.5 %
ERYTHROCYTE [DISTWIDTH] IN BLOOD BY AUTOMATED COUNT: 12 % (ref 11.5–14.5)
HCT VFR BLD AUTO: 46.2 % (ref 41–52)
HGB BLD-MCNC: 15.9 G/DL (ref 13.5–17.5)
IMM GRANULOCYTES # BLD AUTO: 0.01 X10*3/UL (ref 0–0.7)
IMM GRANULOCYTES NFR BLD AUTO: 0.2 % (ref 0–0.9)
LYMPHOCYTES # BLD AUTO: 1.87 X10*3/UL (ref 1.2–4.8)
LYMPHOCYTES NFR BLD AUTO: 35.9 %
MCH RBC QN AUTO: 32.5 PG (ref 26–34)
MCHC RBC AUTO-ENTMCNC: 34.4 G/DL (ref 32–36)
MCV RBC AUTO: 95 FL (ref 80–100)
MONOCYTES # BLD AUTO: 0.44 X10*3/UL (ref 0.1–1)
MONOCYTES NFR BLD AUTO: 8.4 %
NEUTROPHILS # BLD AUTO: 2.79 X10*3/UL (ref 1.2–7.7)
NEUTROPHILS NFR BLD AUTO: 53.6 %
NRBC BLD-RTO: 0 /100 WBCS (ref 0–0)
PLATELET # BLD AUTO: 195 X10*3/UL (ref 150–450)
RBC # BLD AUTO: 4.89 X10*6/UL (ref 4.5–5.9)
WBC # BLD AUTO: 5.2 X10*3/UL (ref 4.4–11.3)

## 2024-07-19 ENCOUNTER — APPOINTMENT (OUTPATIENT)
Dept: CARDIOLOGY | Facility: HOSPITAL | Age: 34
End: 2024-07-19
Payer: COMMERCIAL

## 2024-07-19 ENCOUNTER — HOSPITAL ENCOUNTER (EMERGENCY)
Facility: HOSPITAL | Age: 34
Discharge: HOME | End: 2024-07-19
Attending: STUDENT IN AN ORGANIZED HEALTH CARE EDUCATION/TRAINING PROGRAM
Payer: COMMERCIAL

## 2024-07-19 ENCOUNTER — APPOINTMENT (OUTPATIENT)
Dept: RADIOLOGY | Facility: HOSPITAL | Age: 34
End: 2024-07-19
Payer: COMMERCIAL

## 2024-07-19 VITALS
BODY MASS INDEX: 32.23 KG/M2 | RESPIRATION RATE: 20 BRPM | SYSTOLIC BLOOD PRESSURE: 141 MMHG | OXYGEN SATURATION: 97 % | DIASTOLIC BLOOD PRESSURE: 76 MMHG | HEIGHT: 72 IN | WEIGHT: 238 LBS | HEART RATE: 57 BPM | TEMPERATURE: 97.3 F

## 2024-07-19 DIAGNOSIS — R07.9 CHEST PAIN, UNSPECIFIED TYPE: Primary | ICD-10-CM

## 2024-07-19 LAB
ALBUMIN SERPL BCP-MCNC: 4.7 G/DL (ref 3.4–5)
ALP SERPL-CCNC: 33 U/L (ref 33–120)
ALT SERPL W P-5'-P-CCNC: 13 U/L (ref 10–52)
ANION GAP SERPL CALC-SCNC: 9 MMOL/L (ref 10–20)
AST SERPL W P-5'-P-CCNC: 14 U/L (ref 9–39)
BASOPHILS # BLD AUTO: 0.01 X10*3/UL (ref 0–0.1)
BASOPHILS NFR BLD AUTO: 0.2 %
BILIRUB SERPL-MCNC: 1.2 MG/DL (ref 0–1.2)
BUN SERPL-MCNC: 11 MG/DL (ref 6–23)
CALCIUM SERPL-MCNC: 9.5 MG/DL (ref 8.6–10.3)
CARDIAC TROPONIN I PNL SERPL HS: <3 NG/L (ref 0–20)
CHLORIDE SERPL-SCNC: 102 MMOL/L (ref 98–107)
CO2 SERPL-SCNC: 31 MMOL/L (ref 21–32)
CREAT SERPL-MCNC: 0.97 MG/DL (ref 0.5–1.3)
EGFRCR SERPLBLD CKD-EPI 2021: >90 ML/MIN/1.73M*2
EOSINOPHIL # BLD AUTO: 0.1 X10*3/UL (ref 0–0.7)
EOSINOPHIL NFR BLD AUTO: 1.7 %
ERYTHROCYTE [DISTWIDTH] IN BLOOD BY AUTOMATED COUNT: 12 % (ref 11.5–14.5)
GLUCOSE SERPL-MCNC: 85 MG/DL (ref 74–99)
HCT VFR BLD AUTO: 44 % (ref 41–52)
HGB BLD-MCNC: 15.8 G/DL (ref 13.5–17.5)
IMM GRANULOCYTES # BLD AUTO: 0.01 X10*3/UL (ref 0–0.7)
IMM GRANULOCYTES NFR BLD AUTO: 0.2 % (ref 0–0.9)
LYMPHOCYTES # BLD AUTO: 2.04 X10*3/UL (ref 1.2–4.8)
LYMPHOCYTES NFR BLD AUTO: 35 %
MCH RBC QN AUTO: 33.5 PG (ref 26–34)
MCHC RBC AUTO-ENTMCNC: 35.9 G/DL (ref 32–36)
MCV RBC AUTO: 93 FL (ref 80–100)
MONOCYTES # BLD AUTO: 0.48 X10*3/UL (ref 0.1–1)
MONOCYTES NFR BLD AUTO: 8.2 %
NEUTROPHILS # BLD AUTO: 3.19 X10*3/UL (ref 1.2–7.7)
NEUTROPHILS NFR BLD AUTO: 54.7 %
NRBC BLD-RTO: 0 /100 WBCS (ref 0–0)
PLATELET # BLD AUTO: 199 X10*3/UL (ref 150–450)
POTASSIUM SERPL-SCNC: 4.4 MMOL/L (ref 3.5–5.3)
PROT SERPL-MCNC: 7.1 G/DL (ref 6.4–8.2)
RBC # BLD AUTO: 4.71 X10*6/UL (ref 4.5–5.9)
SODIUM SERPL-SCNC: 138 MMOL/L (ref 136–145)
WBC # BLD AUTO: 5.8 X10*3/UL (ref 4.4–11.3)

## 2024-07-19 PROCEDURE — 84484 ASSAY OF TROPONIN QUANT: CPT | Performed by: PHYSICIAN ASSISTANT

## 2024-07-19 PROCEDURE — 99283 EMERGENCY DEPT VISIT LOW MDM: CPT | Mod: 25

## 2024-07-19 PROCEDURE — 36415 COLL VENOUS BLD VENIPUNCTURE: CPT | Performed by: PHYSICIAN ASSISTANT

## 2024-07-19 PROCEDURE — 84075 ASSAY ALKALINE PHOSPHATASE: CPT | Performed by: PHYSICIAN ASSISTANT

## 2024-07-19 PROCEDURE — 71046 X-RAY EXAM CHEST 2 VIEWS: CPT | Mod: FOREIGN READ | Performed by: RADIOLOGY

## 2024-07-19 PROCEDURE — 71046 X-RAY EXAM CHEST 2 VIEWS: CPT

## 2024-07-19 PROCEDURE — 93005 ELECTROCARDIOGRAM TRACING: CPT

## 2024-07-19 PROCEDURE — 85025 COMPLETE CBC W/AUTO DIFF WBC: CPT | Performed by: PHYSICIAN ASSISTANT

## 2024-07-19 RX ORDER — NAPROXEN 250 MG/1
500 TABLET ORAL ONCE
Status: DISCONTINUED | OUTPATIENT
Start: 2024-07-19 | End: 2024-07-19 | Stop reason: HOSPADM

## 2024-07-19 RX ORDER — LIDOCAINE 560 MG/1
1 PATCH PERCUTANEOUS; TOPICAL; TRANSDERMAL ONCE
Status: DISCONTINUED | OUTPATIENT
Start: 2024-07-19 | End: 2024-07-19 | Stop reason: HOSPADM

## 2024-07-19 RX ORDER — NAPROXEN 500 MG/1
500 TABLET ORAL
Qty: 10 TABLET | Refills: 0 | Status: SHIPPED | OUTPATIENT
Start: 2024-07-19 | End: 2024-07-24

## 2024-07-19 RX ORDER — LIDOCAINE 560 MG/1
1 PATCH PERCUTANEOUS; TOPICAL; TRANSDERMAL DAILY
Qty: 7 PATCH | Refills: 0 | Status: SHIPPED | OUTPATIENT
Start: 2024-07-19

## 2024-07-19 ASSESSMENT — PAIN SCALES - GENERAL: PAINLEVEL_OUTOF10: 7

## 2024-07-19 ASSESSMENT — COLUMBIA-SUICIDE SEVERITY RATING SCALE - C-SSRS
1. IN THE PAST MONTH, HAVE YOU WISHED YOU WERE DEAD OR WISHED YOU COULD GO TO SLEEP AND NOT WAKE UP?: NO
6. HAVE YOU EVER DONE ANYTHING, STARTED TO DO ANYTHING, OR PREPARED TO DO ANYTHING TO END YOUR LIFE?: NO
2. HAVE YOU ACTUALLY HAD ANY THOUGHTS OF KILLING YOURSELF?: NO

## 2024-07-19 ASSESSMENT — PAIN DESCRIPTION - LOCATION: LOCATION: CHEST

## 2024-07-19 ASSESSMENT — PAIN DESCRIPTION - PAIN TYPE: TYPE: ACUTE PAIN

## 2024-07-19 ASSESSMENT — PAIN - FUNCTIONAL ASSESSMENT: PAIN_FUNCTIONAL_ASSESSMENT: 0-10

## 2024-07-19 NOTE — DISCHARGE INSTRUCTIONS
As discussed your symptoms are nonspecific although I do find it reassuring that this is easily reproducible with palpation.  Recommend naproxen twice daily for mild to moderate pain lidocaine patches 12 hours on 12 hours off should pain worsen became short of breath begin having fevers symptoms concerning to call 911 or return immediately otherwise follow-up with your primary physician in the coming days to ensure improvement and resolution.

## 2024-07-19 NOTE — ED TRIAGE NOTES
The patient was seen and examined in triage.    History of Present Illness: The patient is a 34-year-old male for assessment of chest pain since this morning. Pain radiates to the left arm. Worse with coughing, laughing, breathing. He was doing self defense with is wife last night. Denies any shortness of breath, nausea, vomiting, or sweats. Denies significant cardiac family history. Denies smoking cigarettes or illicit drug usage.    Brief Physical Exam:  Exam is limited by the patient sitting in a chair in triage.   Heart: Bradycardic rate and regular rhythm. Left anterior chest wall tenderness. No crepitus. Radial pulses +2/4 bilaterally.  Lungs: Clear to auscultation bilaterally.   Abdomen: Soft, nondistended, normoactive bowel sounds, nontender     Plan: Appropriate labs and diagnostic imaging were ordered.      For the remainder of the patient's workup and ED course, please refer to the main ED provider note. We discussed need for diagnostic testing including laboratory studies and imaging.  We also discussed that they may be asked to wait in the waiting room while these tests are pending.  They understand that if they choose to leave without having the testing completed or resulted that we cannot rule out acute life threatening illnesses and the risks involved could lead to worsening condition, permanent disability or even death.      Disclaimer: This note was dictated by speech recognition. Minor errors in transcription may be present. Please call if questions.

## 2024-07-19 NOTE — ED PROVIDER NOTES
EMERGENCY DEPARTMENT ENCOUNTER      Pt Name: Jason Hdez  MRN: [id number]  Birthdate 1990  Date of evaluation: 7/19/2024  Provider: Jason Lewis DO    CHIEF COMPLAINT       Chief Complaint   Patient presents with    Chest Pain       HISTORY OF PRESENT ILLNESS    Jason Hdez is a 34 y.o. male who presents to the emergency department with Himself for left-sided chest pain.  He describes it as an aching sensation worsening with rotary motion as well as deep inhalation.  Denies any tobacco use no family history of early cardiac symptoms.  Denies any recent long travel by cars or planes no coagulopathies no known cancers no history of blood clots.  Patient denies taking any medications prior to arrival to help with symptoms.  Denies any numbness tingling or weakness either.  No further associated symptoms.          Nursing Notes were reviewed.    REVIEW OF SYSTEMS     CONSTITUTIONAL: Denies fever, sweats, chills.   NEURO: Denies difficulty walking, numbness, weakness, tingling, headache.   HEENT: Denies sore throat, rhinorrhea, changes in vision.   CARDIO: Endorses chest pain.  Denies palpitations.  PULM: Denies shortness of breath, cough.   GI: Denies abdominal pain, nausea, vomiting, diarrhea, constipation, melena, hematochezia.  : Denies painful urination, frequency, hematuria.   MSK: Denies recent trauma.   SKIN: Denies rash, lesions.   ENDOCRINE: Denies unexpected weight-loss.   HEME: Denies bleeding disorder.     PAST MEDICAL HISTORY   No past medical history on file.    SURGICAL HISTORY       Past Surgical History:   Procedure Laterality Date    APPENDECTOMY         ALLERGIES     Penicillins    FAMILY HISTORY       Family History   Problem Relation Name Age of Onset    Depression Mother Monse     Diabetes Mother Monse     Depression Father Jason     Diabetes Paternal Grandmother Analilia         SOCIAL HISTORY       Social History     Socioeconomic History    Marital status:     Tobacco Use    Smoking status: Never    Smokeless tobacco: Never   Substance and Sexual Activity    Alcohol use: Yes     Comment: Very irregular/infrequent    Drug use: Never    Sexual activity: Yes     Partners: Female     Birth control/protection: Condom Male     Social Determinants of Health     Financial Resource Strain: Low Risk  (12/27/2022)    [facility]    Overall Financial Resource Strain (CARDIA)     Difficulty of Paying Living Expenses: Not very hard   Food Insecurity: No Food Insecurity (12/27/2022)    Received from [facility]    Hunger Vital Sign     Worried About Running Out of Food in the Last Year: Never true     Ran Out of Food in the Last Year: Never true   Transportation Needs: No Transportation Needs (12/27/2022)    [email or web address]    PRAPARE - Transportation     Lack of Transportation (Medical): No     Lack of Transportation (Non-Medical): No   Physical Activity: Insufficiently Active (12/25/2022)    Received from [facility]    Exercise Vital Sign     Days of Exercise per Week: 5 days     Minutes of Exercise per Session: 10 min   Intimate Partner Violence: Not At Risk (12/25/2022)    [facility]    Humiliation, Afraid, Rape, and Kick questionnaire     Fear of Current or Ex-Partner: No     Emotionally Abused: No     Physically Abused: No     Sexually Abused: No       PHYSICAL EXAM   VS: As documented in the triage note from today's date and EMR flowsheet were reviewed.  Gen: Well developed. No acute distress. Seated in bed. Appears nontoxic.   Skin: Warm. Dry. Intact. No rashes or lesions.  Eyes: Pupils equally round and reactive to light. Clear sclera.   HENT: Atraumatic  appearance. Mucosal membranes moist. No oral lesions, uvula midline, airway patent.   CV: Regular rate and regular rhythm. S1, S2. No pedal edema. Warm extremities.  Resp: Nonlabored breathing Clear to auscultation bilaterally. No increased work of breathing.   GI: Soft and nontender. No rebound or guarding. Bowel sounds x4 present.   MSK: Symmetric muscle bulk. No joint swelling in the extremities. Compartments are soft. Neurovascularly intact x4 extremities. Radial pulses +2 equal bilaterally.  Pedal pulses +2 equal bilaterally.  There is reproducible left-sided chest wall tenderness with palpation.  Neuro: Alert. Speech fluent. Moving all extremities. No focal deficits. Gait normal.  Psych: Appropriate. Kempt.    DIAGNOSTIC RESULTS   RADIOLOGY:   Non-plain film images such as CT, Ultrasound and MRI are read by the radiologist. Plain radiographic images are visualized and preliminarily interpreted by the emergency physician with the below findings: Chest x-ray no evidence of widened mediastinum or cardiomegaly.  No pneumothorax.      Interpretation per the Radiologist below, if available at the time of this note:    XR chest 2 views   Final Result   Normal heart size with no radiographic signs of active pulmonary   parenchymal infiltration.   Signed by Salina Ortiz DO            ED BEDSIDE ULTRASOUND:   Performed by ED Physician - none    LABS:  Labs Reviewed   COMPREHENSIVE METABOLIC PANEL - Abnormal       Result Value    Glucose 85      Sodium 138      Potassium 4.4      Chloride 102      Bicarbonate 31      Anion Gap 9 (*)     Urea Nitrogen 11      Creatinine 0.97      eGFR >90      Calcium 9.5      Albumin 4.7      Alkaline Phosphatase 33      Total Protein 7.1      AST 14      Bilirubin, Total 1.2      ALT 13     TROPONIN I, HIGH SENSITIVITY - Normal    Troponin I, High Sensitivity <3      Narrative:     Less than 99th percentile of normal range cutoff-  Female and children under 18 years old <14 ng/L; Male  <21 ng/L: Negative  Repeat testing should be performed if clinically indicated.     Female and children under 18 years old 14-50 ng/L; Male 21-50 ng/L:  Consistent with possible cardiac damage and possible increased clinical   risk. Serial measurements may help to assess extent of myocardial damage.     >50 ng/L: Consistent with cardiac damage, increased clinical risk and  myocardial infarction. Serial measurements may help assess extent of   myocardial damage.      NOTE: Children less than 1 year old may have higher baseline troponin   levels and results should be interpreted in conjunction with the overall   clinical context.     NOTE: Troponin I testing is performed using a different   testing methodology at [facility] than at other   [facility]. Direct result comparisons should only   be made within the same method.   CBC WITH AUTO DIFFERENTIAL    WBC 5.8      nRBC 0.0      RBC 4.71      Hemoglobin 15.8      Hematocrit 44.0      MCV 93      MCH 33.5      MCHC 35.9      RDW 12.0      Platelets 199      Neutrophils % 54.7      Immature Granulocytes %, Automated 0.2      Lymphocytes % 35.0      Monocytes % 8.2      Eosinophils % 1.7      Basophils % 0.2      Neutrophils Absolute 3.19      Immature Granulocytes Absolute, Automated 0.01      Lymphocytes Absolute 2.04      Monocytes Absolute 0.48      Eosinophils Absolute 0.10      Basophils Absolute 0.01         All other labs were within normal range or not returned as of this dictation.    EMERGENCY DEPARTMENT COURSE/MDM:   Vitals:    Vitals:    07/19/24 1312 07/19/24 1959   BP: 135/69 141/76   Pulse: (!) 46 57   Resp: 16 20   Temp: 36.8 °C (98.2 °F) 36.3 °C (97.3 °F)   SpO2: 100% 97%   Weight: 108 kg (238 lb)    Height: 1.829 m (6')        I reviewed the patient's triage vitals and they are patient is hypertensive recommend follow-up with primary physician for repeat checks.  Patient does report that he is chronically bradycardic.    Due to the  above findings the following was ordered cardiac workup.  Lidocaine patch and Toradol for pain.    Patient is PERC negative.  I have a low concern for aortic pathology based on clinical examination.  Workup is grossly unremarkable no signs of anemia no significant electrolyte derangements renal function is appropriate cardiac troponin undetectable no acute injury pattern on EKG low concern for ACS.  Patient's pain is reproducible with rotary motions and palpation.  He does report that he was having his spouse practice exercises on him yesterday evening.  What he describes as subduing a psychiatric patient.  Does not recall any direct injury although onset was after this.  I do believe this is likely musculoskeletal in nature.  Patient is given lidocaine patch and Toradol while in the department feeling improved.  He is given strict return precautions appropriate follow-up and discharged in stable condition.    HEART Score:    History:   [ x ] Slightly suspicious - 0   [  ] Moderately suspicious - 1  [  ] Highly suspicious - 2     EKG:   [ x ] Normal - 0    [  ] Non-specific repolarization disturbance (No ST changes, but LBBB, LVH, repolarization changes)  - 1   [  ] Significant ST deviation (ST changes not d/t LBBB, LVH, digoxin)  - 2     Age:   [ x ] < 45 - 0   [  ] 45-64 - 1   [  ] > 65 - 2     Risk Factors:     HTN, HLD, DM, obesity (BMI > 30), smoking (current or w/I 3mo), + fmx (before age of 65), CAD, prior MI, PCI/CABG, CVA/TIA, PAD  [  ] No known risk factors - 0   [ x ] 1-2 risk factors - 1    [  ] >3 risk factors or hx of atherosclerotic disease - 2     Initial troponin:  [ x ] < normal limit - 0   [  ] 1 - 3x normal limit - 1   [  ] > 3x normal limit - 2    Total:   [ x] 0-3 Points: 1.7% risk of major adverse cardiac event in 6 weeks  [ ] 4-6 Points: 12-16.6% risk of major adverse cardiac event in 6 weeks  [ ] 7-10 Points 50-65% risk of major adverse cardiac event in 6 weeks    I did discuss the heart  score results with the patient.  We did discuss the risk stratification. I did discuss that the patient's risk based on the above scoring and their risk of coronary artery disease. The patient is comfortable being discharged home and following up with the appropriate physicians. This is shared decision making. They're to return to the nearest emergency room for any new or worsening symptoms.    ED Course as of 07/20/24 0058 Fri Jul 19, 2024 1913 Interpreted by the Emergency Department Attending: ECG revealed sinus bradycardia at a rate of 46 beats per minute with NE interval 187 , QRS of 103 , QTc of 379.  No acute injury pattern.  No previous EKG to compare. [MG]      ED Course User Index  [MG] Jason AlbafatumalakeDO         Diagnoses as of 07/20/24 0058   Chest pain, unspecified type       Patient was counseled regarding labs, imaging, likely diagnosis, and plan. All questions were answered.     ------------------------------------------------------------------  Information provided by the patient  Previous medical records reviewed office visit 5/8/2024  Considered CTA although not indicated at this time.  Shared medical decision making patient agreeable with outpatient follow-up strict return precautions and home-going medications.  ------------------------------------------------------------------  ED Medications administered this visit:    Medications - No data to display      New Prescriptions from this visit:    Discharge Medication List as of 7/19/2024  7:25 PM        START taking these medications    Details   lidocaine 4 % patch Place 1 patch over 12 hours on the skin once daily. Remove & discard patch within 12 hours or as directed by MD., Starting Fri 7/19/2024, Print      naproxen (Naprosyn) 500 mg tablet Take 1 tablet (500 mg) by mouth 2 times daily (morning and late afternoon) for 5 days., Starting Fri 7/19/2024, Until Wed 7/24/2024, Print             Follow-up:  Amor Rowe MD  [address]  [facility] [address]  [phone]    Schedule an appointment as soon as possible for a visit in 2 days      [facility]  [address] [facility] [address] [phone]  Go to   If symptoms worsen        Final Impression:   1. Chest pain, unspecified type          Jason Lewis DO    (Please note that portions of this note were completed with a voice recognition program.  Efforts were made to edit the dictations but occasionally words are mis-transcribed.)     Jason Lewis DO  07/20/24 0100

## 2024-07-19 NOTE — ED TRIAGE NOTES
Pt states chest pain, worse when taking a deep breath, pain in left elbow that shoots up to his shoulder

## 2024-07-23 LAB
ATRIAL RATE: 45 BPM
P AXIS: 54 DEGREES
PR INTERVAL: 187 MS
Q ONSET: 249 MS
QRS COUNT: 7 BEATS
QRS DURATION: 103 MS
QT INTERVAL: 433 MS
QTC CALCULATION(BAZETT): 379 MS
QTC FREDERICIA: 396 MS
R AXIS: -33 DEGREES
T AXIS: 31 DEGREES
T OFFSET: 466 MS
VENTRICULAR RATE: 46 BPM

## 2024-10-03 ENCOUNTER — APPOINTMENT (OUTPATIENT)
Dept: UROLOGY | Facility: CLINIC | Age: 34
End: 2024-10-03
Payer: COMMERCIAL

## 2024-10-03 VITALS — DIASTOLIC BLOOD PRESSURE: 80 MMHG | SYSTOLIC BLOOD PRESSURE: 121 MMHG | TEMPERATURE: 97.1 F | HEART RATE: 79 BPM

## 2024-10-03 DIAGNOSIS — N53.12 PAIN WITH EJACULATION: ICD-10-CM

## 2024-10-03 DIAGNOSIS — R33.9 INCOMPLETE BLADDER EMPTYING: Primary | ICD-10-CM

## 2024-10-03 LAB
POC APPEARANCE, URINE: CLEAR
POC BILIRUBIN, URINE: NEGATIVE
POC BLOOD, URINE: NEGATIVE
POC COLOR, URINE: YELLOW
POC GLUCOSE, URINE: NEGATIVE MG/DL
POC KETONES, URINE: NEGATIVE MG/DL
POC LEUKOCYTES, URINE: NEGATIVE
POC NITRITE,URINE: NEGATIVE
POC PH, URINE: 7.5 PH
POC PROTEIN, URINE: NEGATIVE MG/DL
POC SPECIFIC GRAVITY, URINE: 1.01
POC UROBILINOGEN, URINE: 0.2 EU/DL

## 2024-10-03 PROCEDURE — 1036F TOBACCO NON-USER: CPT | Performed by: NURSE PRACTITIONER

## 2024-10-03 PROCEDURE — G2211 COMPLEX E/M VISIT ADD ON: HCPCS | Performed by: NURSE PRACTITIONER

## 2024-10-03 PROCEDURE — 81003 URINALYSIS AUTO W/O SCOPE: CPT | Performed by: NURSE PRACTITIONER

## 2024-10-03 PROCEDURE — 99203 OFFICE O/P NEW LOW 30 MIN: CPT | Performed by: NURSE PRACTITIONER

## 2024-10-03 NOTE — PROGRESS NOTES
Subjective   Patient ID: Jason Hdez is a 34 y.o. male who presents for Prostate Issues .  Patient presents for concern of urinary retention and painful ejaculation x 1 year. He feels that he is not fully emptying his bladder, always feels a fullness/pressure in his pelvis. He drinks about 2 liters of water a day and feels like he can urinate every hour. He denies urinary hesitancy, weak stream, pain with urination or blood in his urine. He also endorses painful ejaculation and incomplete ejaculation. He has no issues with maintaining an erection. He is sedentary at work and reports a lot of stress from undergoing a separation with his wife.           Review of Systems    Objective   Physical Exam    Assessment/Plan            Eloise Gtz RN 10/03/24 1:28 PM

## 2024-10-03 NOTE — PROGRESS NOTES
Subjective   Patient ID: Jason Hdez is a 34 y.o. male who presents for Prostate Issues .  Patient presents for concern of urinary retention and painful ejaculation x 1 year. He feels that he is not fully emptying his bladder, always feels a fullness/pressure in his pelvis. He drinks about 2 liters of water a day and feels like he can urinate every hour. He denies urinary hesitancy, weak stream, pain with urination or blood in his urine. He also endorses painful ejaculation and incomplete ejaculation. He has no issues with maintaining an erection. He is sedentary at work and reports a lot of stress from undergoing a separation with his wife. Feels that symptoms became present when he took a sedentary position.         Review of Systems   All other systems reviewed and are negative.      Objective   Physical Exam  Vitals reviewed.     Constitutional: Patient generally appears stated age. Good nutrition. No deformities. Good attention to grooming.  Neck: No neck masses. Good symmetry. No crepitus. Normal thyroid size and consistency with no masses.  Respiratory: Normal respiratory effort. No intercostal retractions. No use of accessory muscles.  Cardiovascular: Examination of the peripheral vascular system reveals no swelling or varicosities with good pulses. Normal extremity temperature, no edema or tenderness.  Abdomen: Examination of the abdomen reveals no masses or tenderness. No hernias detected. Normal liver and spleen size.   Lymphatic: No palpable lymph nodes in the neck, axilla, groin or other locations  Skin: Inspection of the skin reveals no rashes, lesions, ulcers.  Neurologic/Psychiatric: Oriented to time, place and person. Normal mood and affect with no depression, anxiety, or agitation.    Office Visit on 10/03/2024   Component Date Value Ref Range Status    POC Color, Urine 10/03/2024 Yellow  Straw, Yellow, Light-Yellow Final    POC Appearance, Urine 10/03/2024 Clear  Clear Final    POC Glucose,  Urine 10/03/2024 NEGATIVE  NEGATIVE mg/dl Final    POC Bilirubin, Urine 10/03/2024 NEGATIVE  NEGATIVE Final    POC Ketones, Urine 10/03/2024 NEGATIVE  NEGATIVE mg/dl Final    POC Specific Gravity, Urine 10/03/2024 1.015  1.005 - 1.035 Final    POC Blood, Urine 10/03/2024 NEGATIVE  NEGATIVE Final    POC PH, Urine 10/03/2024 7.5  No Reference Range Established PH Final    POC Protein, Urine 10/03/2024 NEGATIVE  NEGATIVE, 30 (1+) mg/dl Final    POC Urobilinogen, Urine 10/03/2024 0.2  0.2, 1.0 EU/DL Final    Poc Nitrite, Urine 10/03/2024 NEGATIVE  NEGATIVE Final    POC Leukocytes, Urine 10/03/2024 NEGATIVE  NEGATIVE Final         Assessment/Plan   Diagnoses and all orders for this visit:  Incomplete bladder emptying  -     POCT UA Automated manually resulted  -     Post-Void Residual  -     Referral to Physical Therapy; Future  Pain with ejaculation    His symptoms appear to correlate with pelvic floor dysfunction. I believe he has significant tightness within his pelvic floor which is responsible for his symptoms. We discussed this. Plan for referral and management with PFPT. Patient is in agreement with plan. Will follow up in a few months or sooner if needed.         Latha Cooley, HÉCTOR-CNP 10/03/24 1:39 PM

## 2024-10-10 ENCOUNTER — OFFICE VISIT (OUTPATIENT)
Dept: PRIMARY CARE | Facility: CLINIC | Age: 34
End: 2024-10-10
Payer: COMMERCIAL

## 2024-10-10 VITALS
HEART RATE: 71 BPM | OXYGEN SATURATION: 97 % | BODY MASS INDEX: 33.63 KG/M2 | SYSTOLIC BLOOD PRESSURE: 120 MMHG | DIASTOLIC BLOOD PRESSURE: 76 MMHG | WEIGHT: 248 LBS

## 2024-10-10 DIAGNOSIS — Z88.0 PENICILLIN ALLERGY: ICD-10-CM

## 2024-10-10 DIAGNOSIS — Z13.220 LIPID SCREENING: Primary | ICD-10-CM

## 2024-10-10 DIAGNOSIS — F90.2 ATTENTION DEFICIT HYPERACTIVITY DISORDER (ADHD), COMBINED TYPE: ICD-10-CM

## 2024-10-10 PROCEDURE — 1036F TOBACCO NON-USER: CPT | Performed by: STUDENT IN AN ORGANIZED HEALTH CARE EDUCATION/TRAINING PROGRAM

## 2024-10-10 PROCEDURE — 99214 OFFICE O/P EST MOD 30 MIN: CPT | Performed by: STUDENT IN AN ORGANIZED HEALTH CARE EDUCATION/TRAINING PROGRAM

## 2024-10-10 NOTE — LETTER
October 10, 2024     Patient: Jason Hdez   YOB: 1990   Date of Visit: 10/10/2024       To Whom It May Concern:    Jason Hdez  1990 is currently under my care for treatment of Attention Deficit Hyperactivity Disorder. He is being treated with a Schedule II medication, Vyvanse (lisdexamphetamine) 30mg once daily. This medication can cause a positive result on a Urine Drug Screen for Amphetamines.     If you have any questions or concerns please do not hesitate to reach out to me.          Sincerely,        Amor Rowe MD    CC: No Recipients

## 2024-10-10 NOTE — PROGRESS NOTES
Subjective   Patient ID: Jason Hdez is a 34 y.o. male who presents for the following    Assessment/Plan   #Preventative Medicine  -Declining flu shot   -PHQ2: 0. No current SI/HI. Sees a therapist.     #Umbilical Cellulitis - resolved   #Asymptomatic Bradycardia - resolved     #ADHD  -Seen by Clinical Psychology on 9/7/24 and 9/21/24 and was dx with Moderate Combined Type ADHD that would be well treated with ADHD medication   -Various medicine discussed  PLAN  -Vyvanse 30mg PO daily x 7 days started. Will see again in 1 week to see if dose needs to be increased or if medication needs to be changed.   -E-Consent on file   -Side effect profile discussed.   -Pt is on probation and letter given to him explaining to probation office that patient is currently being treated with a Schedule II stimulant and may have positive UDS for amphetamines.     Labs ordered: lipid panel     HPI  34M presents for follow up.   Was recently seen by [facility] in Sept 2024 and dx with ADHD and treatment with psychotherapy and medication was recommended.   Discussed various medications and uses. Patient states that he wakes up at 5AM and sleeps at 11PM. Needs aid of medication throughout the whole day. Is currently having difficulty with task management and concentration both at home and work.     Denies fevers, chills, weight loss, lightheadedness, dizziness, vision changes, sore throat, runny nose, CP, SOB, cough, palpitations, n/v/d, abd pain, black/bloody stools, arthralgias, mood disturbance, or new numbness/weakness/tingling in arms/legs/face.      Otherwise doing well. Works sedentary job but tries to eat healthy.     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 NCAT. Aox3   HEENT: PERRLA. EOMI. MMM. Nares patent bl.  Cardiovascular: Rate in 70s. Regular rhythm. No MRG. S1/S2 wnl.   Respiratory: CTABL. No acute respiratory distress.   GI: Soft, NT abdomen.   MSK: ROM x 4.   Extremities: No edema. Cap refill < 2 sec.   Skin: No rashes or bruises.   Neuro: Aox3. Cranial Nerves grossly intact. Motor/sensory wnl.   Psych: Mood wnl.       REVIEW OF SYSTEMS   ROS in HPI     Allergies   Allergen Reactions    Penicillins Other and Unknown       No current outpatient medications on file.     No current facility-administered medications for this visit.       Objective     Office Visit on 10/03/2024   Component Date Value Ref Range Status    POC Color, Urine 10/03/2024 Yellow  Straw, Yellow, Light-Yellow Final    POC Appearance, Urine 10/03/2024 Clear  Clear Final    POC Glucose, Urine 10/03/2024 NEGATIVE  NEGATIVE mg/dl Final    POC Bilirubin, Urine 10/03/2024 NEGATIVE  NEGATIVE Final    POC Ketones, Urine 10/03/2024 NEGATIVE  NEGATIVE mg/dl Final    POC Specific Gravity, Urine 10/03/2024 1.015  1.005 - 1.035 Final    POC Blood, Urine 10/03/2024 NEGATIVE  NEGATIVE Final    POC PH, Urine 10/03/2024 7.5  No Reference Range Established PH Final    POC Protein, Urine 10/03/2024 NEGATIVE  NEGATIVE, 30 (1+) mg/dl Final    POC Urobilinogen, Urine 10/03/2024 0.2  0.2, 1.0 EU/DL Final    Poc Nitrite, Urine 10/03/2024 NEGATIVE  NEGATIVE Final    POC Leukocytes, Urine 10/03/2024 NEGATIVE  NEGATIVE Final   Admission  on 07/19/2024, Discharged on 07/19/2024   Component Date Value Ref Range Status    WBC 07/19/2024 5.8  4.4 - 11.3 x10*3/uL Final    nRBC 07/19/2024 0.0  0.0 - 0.0 /100 WBCs Final    RBC 07/19/2024 4.71  4.50 - 5.90 x10*6/uL Final    Hemoglobin 07/19/2024 15.8  13.5 - 17.5 g/dL Final    Hematocrit 07/19/2024 44.0  41.0 - 52.0 % Final    MCV 07/19/2024 93  80 - 100 fL Final    MCH 07/19/2024 33.5  26.0 - 34.0 pg Final    MCHC 07/19/2024 35.9  32.0 - 36.0 g/dL Final    RDW 07/19/2024 12.0  11.5 - 14.5 % Final    Platelets 07/19/2024 199  150 - 450 x10*3/uL Final    Neutrophils % 07/19/2024 54.7  40.0 - 80.0 % Final    Immature Granulocytes %, Automated 07/19/2024 0.2  0.0 - 0.9 % Final    Lymphocytes % 07/19/2024 35.0  13.0 - 44.0 % Final    Monocytes % 07/19/2024 8.2  2.0 - 10.0 % Final    Eosinophils % 07/19/2024 1.7  0.0 - 6.0 % Final    Basophils % 07/19/2024 0.2  0.0 - 2.0 % Final    Neutrophils Absolute 07/19/2024 3.19  1.20 - 7.70 x10*3/uL Final    Immature Granulocytes Absolute, Au* 07/19/2024 0.01  0.00 - 0.70 x10*3/uL Final    Lymphocytes Absolute 07/19/2024 2.04  1.20 - 4.80 x10*3/uL Final    Monocytes Absolute 07/19/2024 0.48  0.10 - 1.00 x10*3/uL Final    Eosinophils Absolute 07/19/2024 0.10  0.00 - 0.70 x10*3/uL Final    Basophils Absolute 07/19/2024 0.01  0.00 - 0.10 x10*3/uL Final    Glucose 07/19/2024 85  74 - 99 mg/dL Final    Sodium 07/19/2024 138  136 - 145 mmol/L Final    Potassium 07/19/2024 4.4  3.5 - 5.3 mmol/L Final    Chloride 07/19/2024 102  98 - 107 mmol/L Final    Bicarbonate 07/19/2024 31  21 - 32 mmol/L Final    Anion Gap 07/19/2024 9 (L)  10 - 20 mmol/L Final    Urea Nitrogen 07/19/2024 11  6 - 23 mg/dL Final    Creatinine 07/19/2024 0.97  0.50 - 1.30 mg/dL Final    eGFR 07/19/2024 >90  >60 mL/min/1.73m*2 Final    Calcium 07/19/2024 9.5  8.6 - 10.3 mg/dL Final    Albumin 07/19/2024 4.7  3.4 - 5.0 g/dL Final    Alkaline Phosphatase 07/19/2024 33  33 - 120 U/L Final    Total Protein  07/19/2024 7.1  6.4 - 8.2 g/dL Final    AST 07/19/2024 14  9 - 39 U/L Final    Bilirubin, Total 07/19/2024 1.2  0.0 - 1.2 mg/dL Final    ALT 07/19/2024 13  10 - 52 U/L Final    Ventricular Rate 07/19/2024 46  BPM Final    Atrial Rate 07/19/2024 45  BPM Final    OK Interval 07/19/2024 187  ms Final    QRS Duration 07/19/2024 103  ms Final    QT Interval 07/19/2024 433  ms Final    QTC Calculation(Bazett) 07/19/2024 379  ms Final    P Axis 07/19/2024 54  degrees Final    R Axis 07/19/2024 -33  degrees Final    T Axis 07/19/2024 31  degrees Final    QRS Count 07/19/2024 7  beats Final    Q Onset 07/19/2024 249  ms Final    T Offset 07/19/2024 466  ms Final    QTC Fredericia 07/19/2024 396  ms Final    Troponin I, High Sensitivity 07/19/2024 <3  0 - 20 ng/L Final       Radiology: Reviewed imaging in powerchart.  No results found.    Family History   Problem Relation Name Age of Onset    Depression Mother Monse     Diabetes Mother Monse     Depression Father Jason     Diabetes Paternal Grandmother Analilia      Social History     Socioeconomic History    Marital status:    Tobacco Use    Smoking status: Never    Smokeless tobacco: Never   Substance and Sexual Activity    Alcohol use: Yes     Comment: Very irregular/infrequent    Drug use: Never    Sexual activity: Yes     Partners: Female     Birth control/protection: Condom Male     Social Determinants of Health     Financial Resource Strain: Low Risk  (12/27/2022)    Received from [facility]    Overall Financial Resource Strain (CARDIA)     Difficulty of Paying Living Expenses: Not very hard   Food Insecurity: No Food Insecurity (12/27/2022)    Received from [facility]    Hunger Vital Sign     Worried About Running Out of Food in the Last Year: Never true     Ran Out of Food in the Last Year: Never true   Transportation Needs: No Transportation  Needs (12/27/2022)    Received from [facility]    PRAPARE - Transportation     Lack of Transportation (Medical): No     Lack of Transportation (Non-Medical): No   Physical Activity: Insufficiently Active (12/25/2022)    Received from [facility]    Exercise Vital Sign     Days of Exercise per Week: 5 days     Minutes of Exercise per Session: 10 min   Intimate Partner Violence: Not At Risk (12/25/2022)    Received from [facility]    Humiliation, Afraid, Rape, and Kick questionnaire     Fear of Current or Ex-Partner: No     Emotionally Abused: No     Physically Abused: No     Sexually Abused: No     No past medical history on file.  Past Surgical History:   Procedure Laterality Date    APPENDECTOMY         Charting was completed using voice recognition technology and may include unintended errors.

## 2024-10-11 ENCOUNTER — TELEPHONE (OUTPATIENT)
Dept: PRIMARY CARE | Facility: CLINIC | Age: 34
End: 2024-10-11
Payer: COMMERCIAL

## 2024-10-11 DIAGNOSIS — F90.2 ATTENTION DEFICIT HYPERACTIVITY DISORDER (ADHD), COMBINED TYPE: ICD-10-CM

## 2024-10-11 RX ORDER — LISDEXAMFETAMINE DIMESYLATE 30 MG/1
30 CAPSULE ORAL EVERY MORNING
Qty: 7 CAPSULE | Refills: 0 | OUTPATIENT
Start: 2024-10-11 | End: 2024-10-18

## 2024-10-11 RX ORDER — LISDEXAMFETAMINE DIMESYLATE 30 MG/1
30 CAPSULE ORAL EVERY MORNING
Qty: 7 CAPSULE | Refills: 0 | Status: SHIPPED | OUTPATIENT
Start: 2024-10-11 | End: 2024-10-18

## 2024-10-11 NOTE — TELEPHONE ENCOUNTER
PT was in yesterday and had   lisdexamfetamine (Vyvanse) 30 mg capsule  [facility]      Now [facility] wants [facility] which [facility] in [facility] list.  Please transfer.

## 2024-10-24 ENCOUNTER — OFFICE VISIT (OUTPATIENT)
Dept: ORTHOPEDIC SURGERY | Age: 34
End: 2024-10-24
Payer: COMMERCIAL

## 2024-10-24 VITALS
HEART RATE: 70 BPM | TEMPERATURE: 98.4 F | OXYGEN SATURATION: 99 % | HEIGHT: 72 IN | BODY MASS INDEX: 33.32 KG/M2 | WEIGHT: 246 LBS

## 2024-10-24 DIAGNOSIS — M17.12 PRIMARY OSTEOARTHRITIS OF LEFT KNEE: ICD-10-CM

## 2024-10-24 DIAGNOSIS — S83.241D TEAR OF MEDIAL MENISCUS OF RIGHT KNEE, CURRENT, UNSPECIFIED TEAR TYPE, SUBSEQUENT ENCOUNTER: ICD-10-CM

## 2024-10-24 DIAGNOSIS — M17.11 PRIMARY OSTEOARTHRITIS OF RIGHT KNEE: Primary | ICD-10-CM

## 2024-10-24 PROCEDURE — 1036F TOBACCO NON-USER: CPT | Performed by: ORTHOPAEDIC SURGERY

## 2024-10-24 PROCEDURE — 99214 OFFICE O/P EST MOD 30 MIN: CPT | Performed by: ORTHOPAEDIC SURGERY

## 2024-10-24 PROCEDURE — G8484 FLU IMMUNIZE NO ADMIN: HCPCS | Performed by: ORTHOPAEDIC SURGERY

## 2024-10-24 PROCEDURE — G8427 DOCREV CUR MEDS BY ELIG CLIN: HCPCS | Performed by: ORTHOPAEDIC SURGERY

## 2024-10-24 PROCEDURE — G8417 CALC BMI ABV UP PARAM F/U: HCPCS | Performed by: ORTHOPAEDIC SURGERY

## 2024-10-24 RX ORDER — PREDNISONE 10 MG/1
10 TABLET ORAL DAILY
Qty: 20 TABLET | Refills: 0 | Status: SHIPPED | OUTPATIENT
Start: 2024-10-24 | End: 2024-11-05

## 2024-10-24 RX ORDER — PREDNISONE 10 MG/1
10 TABLET ORAL DAILY
Qty: 20 TABLET | Refills: 0 | Status: SHIPPED | OUTPATIENT
Start: 2024-10-24 | End: 2024-10-24 | Stop reason: CLARIF

## 2024-10-24 NOTE — PROGRESS NOTES
20 tablet     Refill:  0    predniSONE (DELTASONE) 10 MG tablet     Sig: Take 1 tablet by mouth daily for 12 days Cannot change sig - take 30 mg daily for three days; then 20 mg daily for three days; then 10 mg daily for three days; then 5 mg daily for three days     Dispense:  20 tablet     Refill:  0       No follow-ups on file.    Sd Blanchard MD

## 2024-10-31 ENCOUNTER — HOSPITAL ENCOUNTER (OUTPATIENT)
Dept: MRI IMAGING | Age: 34
Discharge: HOME | End: 2024-11-02
Payer: COMMERCIAL

## 2024-10-31 DIAGNOSIS — S83.241D TEAR OF MEDIAL MENISCUS OF RIGHT KNEE, CURRENT, UNSPECIFIED TEAR TYPE, SUBSEQUENT ENCOUNTER: ICD-10-CM

## 2024-10-31 PROCEDURE — 73721 MRI JNT OF LWR EXTRE W/O DYE: CPT

## 2024-11-05 NOTE — PROGRESS NOTES
bearing activity, standing, and walking  Alleviating factors: removing weight from leg and rest  Mechanical symptoms: catching and instability  Radiation: no     Activities: walking independently  Restriction:  decreased ambulatory tolerance  Progression:  worsening     Previous treatment:  steroid injection  and knee arthroscopy  NSAIDs:  ibuprofen/motrin  PT:   Completed in the past  Medications:    Current Outpatient Medications on File Prior to Visit   Medication Sig Dispense Refill    ketoconazole (NIZORAL) 2 % cream Apply to affected area once daily. (can increase to twice daily for flares)      ketoconazole (NIZORAL) 2 % shampoo Massage into scalp, let sit for 10 mins, rinse 3 times weekly       No current facility-administered medications on file prior to visit.       Allergies:    Allergies   Allergen Reactions    Pcn [Penicillins]        Physical Exam:    Examination of the right knee    Gait:  antalgic gait affecting right  Inspection:  neutral  Swelling:  none  Erythema:  none  Ecchymosis:  none  Effusion:  1+  Palpation:  distal medial femoral condyle, medial joint line, and lateral patella  Extension:  5  Flexion:  120  Strength:  5  Varus/Valgus stability:  none  Anterior/Posterior Instability:  none  Andra:  negative  Thessaly:  positive  Modified Apley:  negative  Lachman:  negative  Patellar compression:  positive  Neurological/Vascular:  Sensation grossly intact.  Dorsalis pedis palpable and 2+    Radiographs:  None    MRI: As above    Diagnosis:   Diagnosis Orders   1. Primary osteoarthritis of right knee  Ambulatory referral to Physical Therapy          Plan:    Patient has osteoarthritis of the knee laterally and in the patellofemoral articulation. There is no evidence of any internal derangement. We discussed this. Treatment options were discussed. Patient declined a steroid injection. We will put him on meloxicam 15 mg once a day. Additionally, we are going to get him into physical therapy

## 2024-11-06 ENCOUNTER — OFFICE VISIT (OUTPATIENT)
Dept: ORTHOPEDIC SURGERY | Age: 34
End: 2024-11-06
Payer: COMMERCIAL

## 2024-11-06 VITALS — HEIGHT: 72 IN | WEIGHT: 246 LBS | BODY MASS INDEX: 33.32 KG/M2

## 2024-11-06 DIAGNOSIS — M17.11 PRIMARY OSTEOARTHRITIS OF RIGHT KNEE: Primary | ICD-10-CM

## 2024-11-06 PROCEDURE — 99214 OFFICE O/P EST MOD 30 MIN: CPT | Performed by: ORTHOPAEDIC SURGERY

## 2024-11-06 PROCEDURE — 1036F TOBACCO NON-USER: CPT | Performed by: ORTHOPAEDIC SURGERY

## 2024-11-06 PROCEDURE — G8427 DOCREV CUR MEDS BY ELIG CLIN: HCPCS | Performed by: ORTHOPAEDIC SURGERY

## 2024-11-06 PROCEDURE — G8484 FLU IMMUNIZE NO ADMIN: HCPCS | Performed by: ORTHOPAEDIC SURGERY

## 2024-11-06 PROCEDURE — G8417 CALC BMI ABV UP PARAM F/U: HCPCS | Performed by: ORTHOPAEDIC SURGERY

## 2024-11-06 RX ORDER — MELOXICAM 15 MG/1
15 TABLET ORAL DAILY
Qty: 30 TABLET | Refills: 1 | Status: SHIPPED | OUTPATIENT
Start: 2024-11-06 | End: 2024-12-06

## 2024-12-09 ENCOUNTER — APPOINTMENT (OUTPATIENT)
Dept: PRIMARY CARE | Facility: CLINIC | Age: 34
End: 2024-12-09
Payer: COMMERCIAL

## 2024-12-09 ENCOUNTER — OFFICE VISIT (OUTPATIENT)
Dept: PRIMARY CARE | Facility: CLINIC | Age: 34
End: 2024-12-09
Payer: COMMERCIAL

## 2024-12-09 VITALS
RESPIRATION RATE: 18 BRPM | OXYGEN SATURATION: 99 % | WEIGHT: 268 LBS | HEART RATE: 67 BPM | DIASTOLIC BLOOD PRESSURE: 83 MMHG | SYSTOLIC BLOOD PRESSURE: 129 MMHG | TEMPERATURE: 97.5 F | BODY MASS INDEX: 36.35 KG/M2

## 2024-12-09 DIAGNOSIS — F90.2 ATTENTION DEFICIT HYPERACTIVITY DISORDER (ADHD), COMBINED TYPE: Primary | ICD-10-CM

## 2024-12-09 DIAGNOSIS — Q64.4 URACHAL CYST: ICD-10-CM

## 2024-12-09 DIAGNOSIS — M17.11 ARTHRITIS OF RIGHT KNEE: ICD-10-CM

## 2024-12-09 PROBLEM — R19.8 UMBILICUS DISCHARGE: Status: ACTIVE | Noted: 2021-08-09

## 2024-12-09 PROCEDURE — 1036F TOBACCO NON-USER: CPT | Performed by: FAMILY MEDICINE

## 2024-12-09 PROCEDURE — 99213 OFFICE O/P EST LOW 20 MIN: CPT | Performed by: FAMILY MEDICINE

## 2024-12-09 RX ORDER — KETOCONAZOLE 20 MG/ML
1 SHAMPOO, SUSPENSION TOPICAL 3 TIMES WEEKLY
COMMUNITY
Start: 2024-11-04

## 2024-12-09 RX ORDER — LISDEXAMFETAMINE DIMESYLATE 40 MG/1
40 CAPSULE ORAL EVERY MORNING
COMMUNITY
Start: 2024-12-05

## 2024-12-09 RX ORDER — MELOXICAM 15 MG/1
15 TABLET ORAL DAILY
COMMUNITY
Start: 2024-11-06

## 2024-12-09 ASSESSMENT — PATIENT HEALTH QUESTIONNAIRE - PHQ9
1. LITTLE INTEREST OR PLEASURE IN DOING THINGS: MORE THAN HALF THE DAYS
6. FEELING BAD ABOUT YOURSELF - OR THAT YOU ARE A FAILURE OR HAVE LET YOURSELF OR YOUR FAMILY DOWN: NOT AT ALL
2. FEELING DOWN, DEPRESSED OR HOPELESS: MORE THAN HALF THE DAYS
SUM OF ALL RESPONSES TO PHQ9 QUESTIONS 1 AND 2: 4
SUM OF ALL RESPONSES TO PHQ QUESTIONS 1-9: 7
3. TROUBLE FALLING OR STAYING ASLEEP OR SLEEPING TOO MUCH: MORE THAN HALF THE DAYS
8. MOVING OR SPEAKING SO SLOWLY THAT OTHER PEOPLE COULD HAVE NOTICED. OR THE OPPOSITE, BEING SO FIGETY OR RESTLESS THAT YOU HAVE BEEN MOVING AROUND A LOT MORE THAN USUAL: NOT AT ALL
10. IF YOU CHECKED OFF ANY PROBLEMS, HOW DIFFICULT HAVE THESE PROBLEMS MADE IT FOR YOU TO DO YOUR WORK, TAKE CARE OF THINGS AT HOME, OR GET ALONG WITH OTHER PEOPLE: SOMEWHAT DIFFICULT
7. TROUBLE CONCENTRATING ON THINGS, SUCH AS READING THE NEWSPAPER OR WATCHING TELEVISION: NOT AT ALL
9. THOUGHTS THAT YOU WOULD BE BETTER OFF DEAD, OR OF HURTING YOURSELF: NOT AT ALL
4. FEELING TIRED OR HAVING LITTLE ENERGY: SEVERAL DAYS
5. POOR APPETITE OR OVEREATING: NOT AT ALL

## 2024-12-09 ASSESSMENT — PAIN SCALES - GENERAL: PAINLEVEL_OUTOF10: 0-NO PAIN

## 2024-12-09 NOTE — PROGRESS NOTES
Subjective   Patient ID: Jason Hdez is a 34 y.o. male who presents for New Patient Visit, Establish Care, and Consult.    HPI   CCM    ADHD : Started Vyvanse from Psychaitrist  Right knee pain : follow up with Ortho  Umblical infection: recurrent.    Review of Systems   All other systems reviewed and are negative.      Objective   /83 (BP Location: Left arm, Patient Position: Sitting, BP Cuff Size: Large adult)   Pulse 67   Temp 36.4 °C (97.5 °F) (Temporal)   Resp 18   Wt 122 kg (268 lb)   SpO2 99%   BMI 36.35 kg/m²     Physical Exam  Vitals and nursing note reviewed.   Cardiovascular:      Rate and Rhythm: Normal rate and regular rhythm.   Pulmonary:      Effort: Pulmonary effort is normal.      Breath sounds: Normal breath sounds.   Abdominal:      Comments: Normal umbilicus, no tenderness or infection.    Musculoskeletal:      Cervical back: Neck supple.   Lymphadenopathy:      Cervical: No cervical adenopathy.   Neurological:      Mental Status: He is alert.   Psychiatric:         Mood and Affect: Mood normal.         Behavior: Behavior normal.         Thought Content: Thought content normal.         Judgment: Judgment normal.         Assessment/Plan   Diagnoses and all orders for this visit:  Attention deficit hyperactivity disorder (ADHD), combined type  Arthritis of right knee  Comments:  follow with Ortho.  Urachal cyst  -     Referral to General Surgery; Future  Other orders  -     Follow Up In Primary Care - Established; Future    Patient to obtain referral for recurrent umbilical infections: concern for fistula or urachal cyst.

## 2024-12-31 ENCOUNTER — OFFICE VISIT (OUTPATIENT)
Dept: SURGERY | Facility: CLINIC | Age: 34
End: 2024-12-31
Payer: COMMERCIAL

## 2024-12-31 VITALS
WEIGHT: 262 LBS | BODY MASS INDEX: 35.49 KG/M2 | OXYGEN SATURATION: 98 % | DIASTOLIC BLOOD PRESSURE: 78 MMHG | HEART RATE: 60 BPM | TEMPERATURE: 98.2 F | SYSTOLIC BLOOD PRESSURE: 124 MMHG | HEIGHT: 72 IN

## 2024-12-31 DIAGNOSIS — B99.9 INFECTION OF GRANULOMA OF UMBILICUS: Primary | ICD-10-CM

## 2024-12-31 PROBLEM — R19.8 UMBILICUS DISCHARGE: Status: RESOLVED | Noted: 2021-08-09 | Resolved: 2024-12-31

## 2024-12-31 PROCEDURE — 3008F BODY MASS INDEX DOCD: CPT | Performed by: STUDENT IN AN ORGANIZED HEALTH CARE EDUCATION/TRAINING PROGRAM

## 2024-12-31 PROCEDURE — 99203 OFFICE O/P NEW LOW 30 MIN: CPT | Performed by: STUDENT IN AN ORGANIZED HEALTH CARE EDUCATION/TRAINING PROGRAM

## 2024-12-31 PROCEDURE — 99213 OFFICE O/P EST LOW 20 MIN: CPT | Performed by: STUDENT IN AN ORGANIZED HEALTH CARE EDUCATION/TRAINING PROGRAM

## 2024-12-31 RX ORDER — BISMUTH SUBSALICYLATE 262 MG
1 TABLET,CHEWABLE ORAL DAILY
COMMUNITY

## 2024-12-31 RX ORDER — MAGNESIUM L-LACTATE 84 MG
84 TABLET, EXTENDED RELEASE ORAL DAILY
COMMUNITY

## 2024-12-31 ASSESSMENT — PATIENT HEALTH QUESTIONNAIRE - PHQ9
10. IF YOU CHECKED OFF ANY PROBLEMS, HOW DIFFICULT HAVE THESE PROBLEMS MADE IT FOR YOU TO DO YOUR WORK, TAKE CARE OF THINGS AT HOME, OR GET ALONG WITH OTHER PEOPLE: SOMEWHAT DIFFICULT
2. FEELING DOWN, DEPRESSED OR HOPELESS: SEVERAL DAYS
1. LITTLE INTEREST OR PLEASURE IN DOING THINGS: SEVERAL DAYS
SUM OF ALL RESPONSES TO PHQ9 QUESTIONS 1 AND 2: 2

## 2024-12-31 ASSESSMENT — PAIN SCALES - GENERAL: PAINLEVEL_OUTOF10: 2

## 2024-12-31 NOTE — PATIENT INSTRUCTIONS
Radiology / Laboratory  Testing Instructions     The doctor has referred you to [facility] Radiology or Laboratory for additional testing. In order for us to better assist you in the process, please follow these instructions.     Please call today to schedule your Diagnostic Imaging at [phone].   If your order is for lab work, please take your requisition to the nearest [facility] to obtain testing.      Please call our office at [phone]  once imaging is scheduled to make a follow up appointment to discuss results.       Thank you for allowing us to care for you!

## 2025-01-02 ASSESSMENT — ENCOUNTER SYMPTOMS
SHORTNESS OF BREATH: 0
BLOOD IN STOOL: 0
PALPITATIONS: 0
CHEST TIGHTNESS: 0
UNEXPECTED WEIGHT CHANGE: 0
HEMATURIA: 0
DYSURIA: 0
NAUSEA: 0
WOUND: 0
VOICE CHANGE: 0
HEADACHES: 0
CHILLS: 0
TROUBLE SWALLOWING: 0
ABDOMINAL PAIN: 0
FACIAL ASYMMETRY: 0
FEVER: 0
SORE THROAT: 0
SPEECH DIFFICULTY: 0
DIARRHEA: 0
BRUISES/BLEEDS EASILY: 0
ADENOPATHY: 0
ARTHRALGIAS: 0
DIFFICULTY URINATING: 1
VOMITING: 0

## 2025-01-02 NOTE — PROGRESS NOTES
History Of Present Illness  Jason Hdez is a 34 y.o. male presenting for evaluation of recurrent infections at his umbilicus.  He reports he had the first infection in 2022.  He had lost weight at that time and his pants were sitting higher around his umbilicus and he thinks there was some irritation from the waistband.  He had a small amount of dark drainage with pain and swelling.  He received antibiotics which helped with the symptoms.  He has had a few more episodes in the meantime which have all resolved with antibiotics.  In between episodes he does not have any symptoms.  He does have a history of an appendectomy prior to all of the episodes of infection.  He is also recently seen urology for evaluation of urinary retention and painful ejaculation.  He was referred to pelvic floor physical therapy, but has not yet had any sessions.     Past Medical History  He has a past medical history of Umbilicus discharge (08/09/2021).    Surgical History  He has a past surgical history that includes Appendectomy.     Social History  He reports that he has never smoked. He has never been exposed to tobacco smoke. He has never used smokeless tobacco. He reports current alcohol use. He reports that he does not use drugs.    Family History  Family History   Problem Relation Name Age of Onset    Depression Mother Monse     Diabetes Mother Monse     Depression Father Jason     Diabetes Paternal Grandmother Analilia         Allergies  Penicillins    Review of Systems   Constitutional:  Negative for chills, fever and unexpected weight change.   HENT:  Negative for sneezing, sore throat, trouble swallowing and voice change.    Respiratory:  Negative for chest tightness and shortness of breath.    Cardiovascular:  Negative for chest pain and palpitations.   Gastrointestinal:  Negative for abdominal pain, blood in stool, diarrhea, nausea and vomiting.   Endocrine: Negative for cold intolerance and heat intolerance.    Genitourinary:  Positive for difficulty urinating. Negative for decreased urine volume, dysuria and hematuria.   Musculoskeletal:  Negative for arthralgias and gait problem.   Skin:  Negative for rash and wound.   Neurological:  Negative for facial asymmetry, speech difficulty and headaches.   Hematological:  Negative for adenopathy. Does not bruise/bleed easily.   Psychiatric/Behavioral:  Negative for self-injury and suicidal ideas.         Physical Exam  Vitals and nursing note reviewed.   Constitutional:       Appearance: Normal appearance.   HENT:      Head: Normocephalic and atraumatic.      Mouth/Throat:      Mouth: Mucous membranes are moist.      Pharynx: Oropharynx is clear.   Eyes:      Extraocular Movements: Extraocular movements intact.      Pupils: Pupils are equal, round, and reactive to light.   Cardiovascular:      Rate and Rhythm: Normal rate and regular rhythm.      Pulses: Normal pulses.   Pulmonary:      Effort: Pulmonary effort is normal.      Breath sounds: Normal breath sounds.   Abdominal:      General: There is no distension.      Palpations: Abdomen is soft.      Tenderness: There is no abdominal tenderness.      Comments: No acute pathology within the umbilicus at this time   Musculoskeletal:      Cervical back: Normal range of motion and neck supple.   Skin:     General: Skin is warm and dry.   Neurological:      General: No focal deficit present.      Mental Status: He is alert and oriented to person, place, and time.   Psychiatric:         Mood and Affect: Mood normal.         Behavior: Behavior normal.          Last Recorded Vitals  Blood pressure 124/78, pulse 60, temperature 36.8 °C (98.2 °F), temperature source Oral, height 1.829 m (6'), weight 119 kg (262 lb), SpO2 98%.         Assessment/Plan   Problem List Items Addressed This Visit    None  Visit Diagnoses         Codes    Infection of granuloma of umbilicus    -  Primary P83.81, B99.9    Relevant Orders    US abdomen complete     CT abdomen pelvis w IV contrast          34-year-old male with several infections of his umbilicus that have resolved with antibiotics.  These have started after a laparoscopic appendectomy so likely secondary to a suture granuloma at the umbilicus.  [place] and I do not have access to the operative report.  We discussed surgery to explore the umbilicus and remove any residual suture granuloma.  Because he has had some urinary difficulty, I do think it is prudent to rule out urachal cyst.  I have ordered an ultrasound to evaluate for this, and also CT scan of the abdomen pelvis to evaluate for any other pathology causing the recurrent infections.  He will follow-up with me once the imaging tests have been done to review the results and discuss next steps.  All questions were answered.      Ashley Tripp MD

## 2025-01-07 ENCOUNTER — APPOINTMENT (OUTPATIENT)
Dept: UROLOGY | Facility: CLINIC | Age: 35
End: 2025-01-07
Payer: COMMERCIAL

## 2025-01-11 ENCOUNTER — HOSPITAL ENCOUNTER (OUTPATIENT)
Dept: RADIOLOGY | Facility: HOSPITAL | Age: 35
End: 2025-01-11
Payer: COMMERCIAL

## 2025-01-12 ENCOUNTER — HOSPITAL ENCOUNTER (OUTPATIENT)
Dept: RADIOLOGY | Facility: HOSPITAL | Age: 35
Discharge: HOME | End: 2025-01-12
Payer: COMMERCIAL

## 2025-01-12 DIAGNOSIS — B99.9 INFECTION OF GRANULOMA OF UMBILICUS: ICD-10-CM

## 2025-01-12 PROCEDURE — 76700 US EXAM ABDOM COMPLETE: CPT

## 2025-01-12 PROCEDURE — 76700 US EXAM ABDOM COMPLETE: CPT | Performed by: RADIOLOGY

## 2025-01-24 ENCOUNTER — HOSPITAL ENCOUNTER (OUTPATIENT)
Dept: RADIOLOGY | Facility: HOSPITAL | Age: 35
Discharge: HOME | End: 2025-01-24
Payer: COMMERCIAL

## 2025-01-24 DIAGNOSIS — B99.9 INFECTION OF GRANULOMA OF UMBILICUS: ICD-10-CM

## 2025-01-24 PROCEDURE — 74177 CT ABD & PELVIS W/CONTRAST: CPT

## 2025-01-24 PROCEDURE — 2550000001 HC RX 255 CONTRASTS: Performed by: STUDENT IN AN ORGANIZED HEALTH CARE EDUCATION/TRAINING PROGRAM

## 2025-01-24 RX ADMIN — IOHEXOL 100 ML: 350 INJECTION, SOLUTION INTRAVENOUS at 17:59

## 2025-02-03 ENCOUNTER — TELEMEDICINE (OUTPATIENT)
Dept: SURGERY | Facility: CLINIC | Age: 35
End: 2025-02-03
Payer: COMMERCIAL

## 2025-02-03 DIAGNOSIS — B99.9 INFECTION OF GRANULOMA OF UMBILICUS: Primary | ICD-10-CM

## 2025-02-03 ASSESSMENT — ENCOUNTER SYMPTOMS
ABDOMINAL PAIN: 0
DIARRHEA: 0
VOMITING: 0
BRUISES/BLEEDS EASILY: 0
CHEST TIGHTNESS: 0
FACIAL ASYMMETRY: 0
SHORTNESS OF BREATH: 0
WOUND: 0
HEMATURIA: 0
CHILLS: 0
TROUBLE SWALLOWING: 0
ARTHRALGIAS: 0
FEVER: 0
SORE THROAT: 0
HEADACHES: 0
PALPITATIONS: 0
ADENOPATHY: 0
VOICE CHANGE: 0
DYSURIA: 0
UNEXPECTED WEIGHT CHANGE: 0
SPEECH DIFFICULTY: 0
BLOOD IN STOOL: 0
NAUSEA: 0

## 2025-02-03 NOTE — PROGRESS NOTES
I performed this visit using real-time telehealth tools, including an audio/video OR telephone connection between Jason Hdez and Ashley Tripp MD     Telephone communications between the patient (at the originating site) and provider (at the distant site) was utilized to provide this telehealth service.   Verbal consent was requested and obtained from the patient on this day 2/3/25 for a telehealth visit.       History Of Present Illness  Jason Hdez is a 34 y.o. male presenting for follow-up after imaging studies were obtained.  He has recurrent actions at his umbilicus.  He obtained a CT scan and a ultrasound of the abdomen.  He has not had any issues since I saw him last in clinic.     Past Medical History  He has a past medical history of Umbilicus discharge (08/09/2021).    Surgical History  He has a past surgical history that includes Appendectomy.     Social History  He reports that he has never smoked. He has never been exposed to tobacco smoke. He has never used smokeless tobacco. He reports current alcohol use. He reports that he does not use drugs.    Family History  Family History   Problem Relation Name Age of Onset    Depression Mother Monse     Diabetes Mother Monse     Depression Father Jason     Diabetes Paternal Grandmother Analilia         Allergies  Penicillins    Review of Systems   Constitutional:  Negative for chills, fever and unexpected weight change.   HENT:  Negative for sneezing, sore throat, trouble swallowing and voice change.    Respiratory:  Negative for chest tightness and shortness of breath.    Cardiovascular:  Negative for chest pain and palpitations.   Gastrointestinal:  Negative for abdominal pain, blood in stool, diarrhea, nausea and vomiting.   Endocrine: Negative for cold intolerance and heat intolerance.   Genitourinary:  Negative for decreased urine volume, dysuria and hematuria.   Musculoskeletal:  Negative for arthralgias and gait problem.   Skin:  Negative  for rash and wound.   Neurological:  Negative for facial asymmetry, speech difficulty and headaches.   Hematological:  Negative for adenopathy. Does not bruise/bleed easily.   Psychiatric/Behavioral:  Negative for self-injury and suicidal ideas.         Physical Exam  Neurological:      Mental Status: He is alert.          Last Recorded Vitals  There were no vitals taken for this visit.    Relevant Results  IMPRESSION:  1.  No inflammatory changes are noted in the periumbilical region.  The abdominal wall soft tissues are unremarkable in appearance.  2. No acute process within the abdomen or pelvis.    IMPRESSION:  Limited, but grossly unremarkable ultrasound of the abdomen.         Assessment/Plan   Problem List Items Addressed This Visit    None  Visit Diagnoses         Codes    Infection of granuloma of umbilicus    -  Primary P83.81, B99.9          The imaging to include a CT scan of his abdomen pelvis and ultrasound of his abdomen was negative, ruling out a urachal cyst.  I again reiterated the fact that I think this is a suture granuloma from his prior appendectomy that is intermittently getting infected and causing pain and drainage.  We discussed 2 options moving forward, 1 being conservative management and feeling with the symptoms as they arise with antibiotics and I&D as needed.  The other 1 being proceeding with surgery or I would essentially perform an umbilical hernia repair, cutting down to the fascial level and excising any tissue in that area which could be causing recurrent infections and performing a primary repair to close up the defect.  This point he wants to continue with active management.  He will call back with any recurrent symptoms.  If he should choose to proceed with surgery, it would be CPT code 52372.      Ashley Tripp MD

## 2025-02-18 ENCOUNTER — APPOINTMENT (OUTPATIENT)
Dept: ALLERGY | Facility: CLINIC | Age: 35
End: 2025-02-18
Payer: COMMERCIAL

## 2025-02-18 VITALS
SYSTOLIC BLOOD PRESSURE: 132 MMHG | WEIGHT: 279 LBS | OXYGEN SATURATION: 100 % | HEART RATE: 68 BPM | DIASTOLIC BLOOD PRESSURE: 90 MMHG | BODY MASS INDEX: 37.84 KG/M2

## 2025-02-18 DIAGNOSIS — J30.9 ALLERGIC RHINITIS, UNSPECIFIED SEASONALITY, UNSPECIFIED TRIGGER: ICD-10-CM

## 2025-02-18 DIAGNOSIS — T63.91XD TOXIC EFFECT OF VENOM, ACCIDENTAL OR UNINTENTIONAL, SUBSEQUENT ENCOUNTER: Primary | ICD-10-CM

## 2025-02-18 DIAGNOSIS — Z88.0 PENICILLIN ALLERGY: ICD-10-CM

## 2025-02-18 PROBLEM — T63.91XA TOXIC EFFECT OF VENOM: Status: ACTIVE | Noted: 2025-02-18

## 2025-02-18 PROCEDURE — 95024 IQ TESTS W/ALLERGENIC XTRCS: CPT | Performed by: ALLERGY & IMMUNOLOGY

## 2025-02-18 PROCEDURE — 95018 ALL TSTG PERQ&IQ DRUGS/BIOL: CPT | Performed by: ALLERGY & IMMUNOLOGY

## 2025-02-18 PROCEDURE — 99204 OFFICE O/P NEW MOD 45 MIN: CPT | Performed by: ALLERGY & IMMUNOLOGY

## 2025-02-18 PROCEDURE — 95004 PERQ TESTS W/ALRGNC XTRCS: CPT | Performed by: ALLERGY & IMMUNOLOGY

## 2025-02-18 RX ORDER — FLUTICASONE PROPIONATE 50 MCG
1 SPRAY, SUSPENSION (ML) NASAL 2 TIMES DAILY
Qty: 16 G | Refills: 1 | Status: SHIPPED | OUTPATIENT
Start: 2025-02-18 | End: 2025-05-19

## 2025-02-18 RX ORDER — AZELASTINE 1 MG/ML
2 SPRAY, METERED NASAL 2 TIMES DAILY
Qty: 30 ML | Refills: 5 | Status: SHIPPED | OUTPATIENT
Start: 2025-02-18 | End: 2026-02-18

## 2025-02-18 NOTE — LETTER
"February 19, 2025     Vitor SAEZ MD  [address] [address]    Patient: Jason Hdez   YOB: 1990   Date of Visit: 2/18/2025       Dear Dr. Vitor SAEZ MD:    Thank you for referring Jason Hdez to me for evaluation. Below are my notes for this consultation.  If you have questions, please do not hesitate to call me. I look forward to following your patient along with you.       Sincerely,     Yumiko Gil MD      CC: Amor Rowe MD  ______________________________________________________________________________________      Subjective  Patient ID:   [id number]   Jason Hdez \"Carlos\" is a 34 y.o. male who presents for Allergy Testing (NPV pt would like to be tested for penicillin.  Pt has been tested for indoor/outdoor allergens 20 years ago) and Allergies.    Chief Complaint   Patient presents with   • Allergy Testing     NPV pt would like to be tested for penicillin.  Pt has been tested for indoor/outdoor allergens 20 years ago   • Allergies      This is a new patient, with history of ADHD, fatty liver disease, psoriasis, referred by Amor Rowe MD, for penicillin allergy testing.    Patient reports he does not recall having had a reaction after penicillin.  However, he and his parents have always been told he was allergic.  He has avoided it but would like confirmation.    Patient has a history of allergies with testing at 17 y/o and allergic to mice, mold and cockroaches.  He has had increasing seasonal symptoms including nasal congestion and intermittent itchy eyes.  He moved here from [place] in 2021 and notes Sx are about the same.  He has tried a few OTC nasal sprays and allergy medications with no relief.  He denies any problems around cats or dogs.  He feels mold is the prevalent problem for him and he always feel slightly congested.     Patient reports acne in \"strange\" places after consuming gluten.  His entire family has a problem with " "gluten, each with differing reactions.  He defers blood test to rule out Celiac stating he knows he is not allergic.    All the men in his family are allergic to venom stings with worsening Sx as they age.  However, patient states his symptoms have become less severe.  He had a sting on his right arm and developed a large local swelling on his entire arm requiring prednisone.  He had really no reaction after his last sting.  He does not carry an EpiPen and has never been tested.    Review of Systems   HENT:  Positive for congestion.      Objective  /90   Pulse 68   Wt 127 kg (279 lb)   SpO2 100%   BMI 37.84 kg/m²      Physical Exam  Constitutional:       Appearance: Normal appearance.   HENT:      Head: Normocephalic and atraumatic.      Right Ear: External ear normal. There is no impacted cerumen.      Left Ear: External ear normal. There is no impacted cerumen.      Nose: No congestion or rhinorrhea.   Eyes:      Extraocular Movements: Extraocular movements intact.      Conjunctiva/sclera: Conjunctivae normal.      Pupils: Pupils are equal, round, and reactive to light.   Cardiovascular:      Rate and Rhythm: Normal rate and regular rhythm.      Heart sounds: No murmur heard.     No friction rub. No gallop.   Pulmonary:      Effort: No respiratory distress.      Breath sounds: No wheezing, rhonchi or rales.   Skin:     General: Skin is warm and dry.   Neurological:      Mental Status: He is alert.   Psychiatric:         Mood and Affect: Mood normal.         Behavior: Behavior normal.     Allergy testing was performed on Jason Hdez \"Carlos\" using standard technique. There were no immediate complications.    Test Results  Panel 1  1.   Histamine: 5 x 5  2.   Saline - Diluent: 0  3.   Cockroach: 2  4.   Cotton Linters: 0  5.   Cat: 0  6.   Do  7.   D. Farinae: 3  8.   D. Pter: 3  9.   Feather: 0  10. Alternaria: 0  Tree Panel  1.   Omer: 0  2.   Beech: 0  3.   Birch: 0  4.   [place]: 0  5.   " Silver Maple: 0  6.   Hickory: 0  7.   Maple: 2  8.   Oak: 0  9.   [place]: 0  10. Canvas: 0  Grass/Misc Tree  1.   Bermuda: 0  2.   [place] Blue: 0  3.   Cesar: 0  4.   Jesse: 0  5.   Orchard: 0  6.   Red Top: 0  7.   Rye Grass: 0  8.   Sweet Vernal: 0  9.   Black [place]: 0  10. [place]: 0  Weeds  1.   Cocklebur: 0  2.   Common Mugwort: 0  3.   English Plantain: 0  4.   Hemp: 0  5.   Goldenrod: 0  6.   Kochia: 0  7.   Lamb's Quarter: 0  8.   Slaughter Elder: 0  9.   Pigweed: 0  10. Ragweed: 0  Molds  1.   Aspergillus Niger: 00  2.   Aureobasid: 0  3.   Bipolaris: 0  4.   Cladosporidium: 0  5.   Epicoccum: 0  6.   Fusarium: 0  7.   Geotrichum: 0  8.   Helminthosporium: 0  9.   Penicillium: 0  10. Phoma: 0  Animal  1.   Cow: 0  2.   Gerbil: 0  3.   Goat: 0  4.   [place] Pi  5.   Hamster: 0  6.   Horse: 0  7.   Mosquito: 0  8.   Mouse: 0  9.   Rabbit: 0  10. Rat: 0    Intradermal allergy testing was performed on Carlos Hdez using standard technique. There were no immediate complications.    Test Results  ID  Cat: 0  Common Weed Mix: 2  Cotton: 0  Do  Feather: 0  KORT Mix: 0  Mold Mix #1: 2  Mold Mix #2: 2  Ragweed: 2  Tree Mix: 0    Skin testing is positive to dust mite, cockroach, weeds, mold and maple tree.    Drug/Vaccine allergy testing was performed on Carlos Lemuel using standard technique. There were no immediate complications.    Test Results  Controls  Positive Histamine:  (intradermal histamine 10 x 10)  Intradermal Saline: 0  Comments  Comments: Needs oral challenge to amoxicillin  ANTIGEN 1  Drug/Vaccine Name: Penicillin  Concentration/Solution: 10,000 U  Result: Negative    Skin testing to penicillin is negative.    Assessment/Plan    Toxic effect of venom  There is a positive FMHx of venom allergy in the men in his family.  He had a sting on his right arm and developed swelling of his entire arm requiring prednisone.  However, he had only a very mild reaction after his last sting.  He does  not carry an EpiPen and has never been tested.    He will have venom Immunocap.    Penicillin allergy  He does not recall a reaction but states he and his parents have always been told he was allergic.  He has avoided it.    Skin testing to penicillin is negative.  He will return for oral challenge to amoxicillin.    Allergic rhinitis  He has H/O allergy testing at 17, positive to mice, mold and cockroaches.  He C/O increasing seasonal nasal congestion and intermittent itchy eyes.  He has tried a few OTC nasal sprays and allergy medications with no relief.  He denies any problems around cats or dogs and reports mold is his worst trigger.    Skin testing is positive to dust mite, cockroach, weeds, mold and maple tree.    At onset of congestion, take Flonase one spray each side twice a day and azelastine two sprays each nostril twice a day.      Environmental handout provided.    By signing my name below, I, Hardy Acharyaibe, attest that this documentation has been prepared under the direction and in the presence of Yumiko Gil MD.  All medical record entries made by the Scribe were at my direction and personally dictated by me. I have reviewed the chart and agree that the record accurately reflects my personal performance of the history, physical exam, discussion and plan.

## 2025-02-18 NOTE — ASSESSMENT & PLAN NOTE
There is a positive FMHx of venom allergy in the men in his family.  He had a sting on his right arm and developed swelling of his entire arm requiring prednisone.  However, he had only a very mild reaction after his last sting.  He does not carry an EpiPen and has never been tested.    He will have venom Immunocap.

## 2025-02-18 NOTE — ASSESSMENT & PLAN NOTE
He does not recall a reaction but states he and his parents have always been told he was allergic.  He has avoided it.    Skin testing to penicillin is negative.  He will return for oral challenge to amoxicillin.

## 2025-02-18 NOTE — PROGRESS NOTES
"  Subjective   Patient ID:   [id number]   Jason Hdez \"Carlos\" is a 34 y.o. male who presents for Allergy Testing (NPV pt would like to be tested for penicillin.  Pt has been tested for indoor/outdoor allergens 20 years ago) and Allergies.    Chief Complaint   Patient presents with    Allergy Testing     NPV pt would like to be tested for penicillin.  Pt has been tested for indoor/outdoor allergens 20 years ago    Allergies      This is a new patient, with history of ADHD, fatty liver disease, psoriasis, referred by Amor Rowe MD, for penicillin allergy testing.    Patient reports he does not recall having had a reaction after penicillin.  However, he and his parents have always been told he was allergic.  He has avoided it but would like confirmation.    Patient has a history of allergies with testing at 17 y/o and allergic to mice, mold and cockroaches.  He has had increasing seasonal symptoms including nasal congestion and intermittent itchy eyes.  He moved here from [place] in 2021 and notes Sx are about the same.  He has tried a few OTC nasal sprays and allergy medications with no relief.  He denies any problems around cats or dogs.  He feels mold is the prevalent problem for him and he always feel slightly congested.     Patient reports acne in \"strange\" places after consuming gluten.  His entire family has a problem with gluten, each with differing reactions.  He defers blood test to rule out Celiac stating he knows he is not allergic.    All the men in his family are allergic to venom stings with worsening Sx as they age.  However, patient states his symptoms have become less severe.  He had a sting on his right arm and developed a large local swelling on his entire arm requiring prednisone.  He had really no reaction after his last sting.  He does not carry an EpiPen and has never been tested.    Review of Systems   HENT:  Positive for congestion.      Objective   /90   Pulse 68   Wt 127 kg (279 " "lb)   SpO2 100%   BMI 37.84 kg/m²      Physical Exam  Constitutional:       Appearance: Normal appearance.   HENT:      Head: Normocephalic and atraumatic.      Right Ear: External ear normal. There is no impacted cerumen.      Left Ear: External ear normal. There is no impacted cerumen.      Nose: No congestion or rhinorrhea.   Eyes:      Extraocular Movements: Extraocular movements intact.      Conjunctiva/sclera: Conjunctivae normal.      Pupils: Pupils are equal, round, and reactive to light.   Cardiovascular:      Rate and Rhythm: Normal rate and regular rhythm.      Heart sounds: No murmur heard.     No friction rub. No gallop.   Pulmonary:      Effort: No respiratory distress.      Breath sounds: No wheezing, rhonchi or rales.   Skin:     General: Skin is warm and dry.   Neurological:      Mental Status: He is alert.   Psychiatric:         Mood and Affect: Mood normal.         Behavior: Behavior normal.     Allergy testing was performed on Jason Hdez \"Carlos\" using standard technique. There were no immediate complications.    Test Results  Panel 1  1.   Histamine: 5 x 5  2.   Saline - Diluent: 0  3.   Cockroach: 2  4.   Cotton Linters: 0  5.   Cat: 0  6.   Do  7.   D. Farinae: 3  8.   D. Pter: 3  9.   Feather: 0  10. Alternaria: 0  Tree Panel  1.   Omer: 0  2.   Beech: 0  3.   Birch: 0  4.   [place]: 0  5.   Silver Maple: 0  6.   Hickory: 0  7.   Maple: 2  8.   Oak: 0  9.   [place]: 0  10. [place]: 0  Grass/Misc Tree  1.   [place]: 0  2.   [place] Blue: 0  3.   Cesar: 0  4.   Jesse: 0  5.   Orchard: 0  6.   Red Top: 0  7.   Rye Grass: 0  8.   Sweet Vernal: 0  9.   Black [place]: 0  10. [place]: 0  Weeds  1.   Cocklebur: 0  2.   Common Mugwort: 0  3.   English Plantain: 0  4.   Hemp: 0  5.   Goldenrod: 0  6.   Kochia: 0  7.   Lamb's Quarter: 0  8.   Slaughter Elder: 0  9.   Pigweed: 0  10. Ragweed: 0  Molds  1.   Aspergillus [place]: 00  2.   Aureobasid: 0  3.   Bipolaris: 0  4.   Cladosporidium: " 0  5.   Epicoccum: 0  6.   Fusarium: 0  7.   Geotrichum: 0  8.   Helminthosporium: 0  9.   Penicillium: 0  10. Phoma: 0  Animal  1.   Cow: 0  2.   Gerbil: 0  3.   Goat: 0  4.   [place] Pi  5.   Hamster: 0  6.   Horse: 0  7.   Mosquito: 0  8.   Mouse: 0  9.   Rabbit: 0  10. Rat: 0    Intradermal allergy testing was performed on Carlos Hdez using standard technique. There were no immediate complications.    Test Results  ID  Cat: 0  Common Weed Mix: 2  Cotton: 0  Do  Feather: 0  KORT Mix: 0  Mold Mix #1: 2  Mold Mix #2: 2  Ragweed: 2  Tree Mix: 0    Skin testing is positive to dust mite, cockroach, weeds, mold and maple tree.    Drug/Vaccine allergy testing was performed on Carlos Hdez using standard technique. There were no immediate complications.    Test Results  Controls  Positive Histamine:  (intradermal histamine 10 x 10)  Intradermal Saline: 0  Comments  Comments: Needs oral challenge to amoxicillin  ANTIGEN 1  Drug/Vaccine Name: Penicillin  Concentration/Solution: 10,000 U  Result: Negative    Skin testing to penicillin is negative.    Assessment/Plan     Toxic effect of venom  There is a positive FMHx of venom allergy in the men in his family.  He had a sting on his right arm and developed swelling of his entire arm requiring prednisone.  However, he had only a very mild reaction after his last sting.  He does not carry an EpiPen and has never been tested.    He will have venom Immunocap.    Penicillin allergy  He does not recall a reaction but states he and his parents have always been told he was allergic.  He has avoided it.    Skin testing to penicillin is negative.  He will return for oral challenge to amoxicillin.    Allergic rhinitis  He has H/O allergy testing at 17, positive to mice, mold and cockroaches.  He C/O increasing seasonal nasal congestion and intermittent itchy eyes.  He has tried a few OTC nasal sprays and allergy medications with no relief.  He denies any problems around  cats or dogs and reports mold is his worst trigger.    Skin testing is positive to dust mite, cockroach, weeds, mold and maple tree.    At onset of congestion, take Flonase one spray each side twice a day and azelastine two sprays each nostril twice a day.      Environmental handout provided.    By signing my name below, I, Shannon Acharya, attest that this documentation has been prepared under the direction and in the presence of Yumiko Gil MD.  All medical record entries made by the Hardyibe were at my direction and personally dictated by me. I have reviewed the chart and agree that the record accurately reflects my personal performance of the history, physical exam, discussion and plan.

## 2025-02-18 NOTE — PATIENT INSTRUCTIONS
Skin testing to penicillin is negative.  Return for oral challenge to amoxicillin.  Be sure to avoid all antihistamines 5 days before testing.  Testing will take approximately 2-3 hours.        Skin testing is positive to dust mite, cockroach, weeds, mold and maple tree.    Be sure to wash your bedding, including your sheets, weekly in hot water, in order to reduce dust mites.    Encase your pillow and mattress in a hypoallergenic or anti-dust mite case.    Invest in a HEPA air filter if you do not have one, and if possible, put it in the patient's bedroom.        Complete blood work to evaluate venom allergy.  I will follow up with you with results and further recommendations.     At onset of congestion, take Flonase one spray each side twice a day and azelastine two sprays each nostril twice a day.  To increase the efficacy of your nasal spray, be sure to look down while using it.? Spray slightly away from the direction of your nasal septum (the bone in the middle of your nose) and only sniff after you have sprayed - avoid spraying and sniffing at the same time, or else a lot of spray will go down your throat.      Follow up in April 2025 or sooner if problems or symptoms worsen prior.

## 2025-02-18 NOTE — ASSESSMENT & PLAN NOTE
He has H/O allergy testing at 17, positive to mice, mold and cockroaches.  He C/O increasing seasonal nasal congestion and intermittent itchy eyes.  He has tried a few OTC nasal sprays and allergy medications with no relief.  He denies any problems around cats or dogs and reports mold is his worst trigger.    Skin testing is positive to dust mite, cockroach, weeds, mold and maple tree.    At onset of congestion, take Flonase one spray each side twice a day and azelastine two sprays each nostril twice a day.      Environmental handout provided.

## 2025-02-20 ENCOUNTER — APPOINTMENT (OUTPATIENT)
Dept: UROLOGY | Facility: CLINIC | Age: 35
End: 2025-02-20
Payer: COMMERCIAL

## 2025-05-13 ENCOUNTER — APPOINTMENT (OUTPATIENT)
Dept: ALLERGY | Facility: CLINIC | Age: 35
End: 2025-05-13
Payer: COMMERCIAL

## 2025-07-21 ENCOUNTER — APPOINTMENT (OUTPATIENT)
Dept: PRIMARY CARE | Facility: CLINIC | Age: 35
End: 2025-07-21
Payer: COMMERCIAL

## 2025-07-21 VITALS
HEART RATE: 78 BPM | TEMPERATURE: 97.7 F | WEIGHT: 300 LBS | OXYGEN SATURATION: 95 % | BODY MASS INDEX: 40.69 KG/M2 | SYSTOLIC BLOOD PRESSURE: 146 MMHG | DIASTOLIC BLOOD PRESSURE: 84 MMHG

## 2025-07-21 DIAGNOSIS — K21.9 GASTROESOPHAGEAL REFLUX DISEASE, UNSPECIFIED WHETHER ESOPHAGITIS PRESENT: Primary | ICD-10-CM

## 2025-07-21 PROCEDURE — 99214 OFFICE O/P EST MOD 30 MIN: CPT | Performed by: FAMILY MEDICINE

## 2025-07-21 RX ORDER — OMEPRAZOLE 40 MG/1
40 CAPSULE, DELAYED RELEASE ORAL
Qty: 90 CAPSULE | Refills: 0 | Status: SHIPPED | OUTPATIENT
Start: 2025-07-21 | End: 2025-10-19

## 2025-07-21 RX ORDER — DEXTROAMPHETAMINE SACCHARATE, AMPHETAMINE ASPARTATE, DEXTROAMPHETAMINE SULFATE AND AMPHETAMINE SULFATE 2.5; 2.5; 2.5; 2.5 MG/1; MG/1; MG/1; MG/1
10 TABLET ORAL DAILY
COMMUNITY
Start: 2025-05-29

## 2025-07-21 RX ORDER — SERTRALINE HYDROCHLORIDE 50 MG/1
1 TABLET, FILM COATED ORAL
COMMUNITY
Start: 2025-06-19

## 2025-07-21 RX ORDER — SERTRALINE HYDROCHLORIDE 25 MG/1
1 TABLET, FILM COATED ORAL
COMMUNITY
Start: 2025-05-22 | End: 2025-07-21 | Stop reason: WASHOUT

## 2025-07-21 ASSESSMENT — PATIENT HEALTH QUESTIONNAIRE - PHQ9
4. FEELING TIRED OR HAVING LITTLE ENERGY: MORE THAN HALF THE DAYS
2. FEELING DOWN, DEPRESSED OR HOPELESS: NOT AT ALL
9. THOUGHTS THAT YOU WOULD BE BETTER OFF DEAD, OR OF HURTING YOURSELF: NOT AT ALL
1. LITTLE INTEREST OR PLEASURE IN DOING THINGS: NEARLY EVERY DAY
3. TROUBLE FALLING OR STAYING ASLEEP OR SLEEPING TOO MUCH: MORE THAN HALF THE DAYS
6. FEELING BAD ABOUT YOURSELF - OR THAT YOU ARE A FAILURE OR HAVE LET YOURSELF OR YOUR FAMILY DOWN: NOT AT ALL
SUM OF ALL RESPONSES TO PHQ9 QUESTIONS 1 AND 2: 0
2. FEELING DOWN, DEPRESSED OR HOPELESS: NEARLY EVERY DAY
8. MOVING OR SPEAKING SO SLOWLY THAT OTHER PEOPLE COULD HAVE NOTICED. OR THE OPPOSITE, BEING SO FIGETY OR RESTLESS THAT YOU HAVE BEEN MOVING AROUND A LOT MORE THAN USUAL: NOT AT ALL
7. TROUBLE CONCENTRATING ON THINGS, SUCH AS READING THE NEWSPAPER OR WATCHING TELEVISION: MORE THAN HALF THE DAYS
SUM OF ALL RESPONSES TO PHQ9 QUESTIONS 1 AND 2: 6
5. POOR APPETITE OR OVEREATING: MORE THAN HALF THE DAYS
SUM OF ALL RESPONSES TO PHQ QUESTIONS 1-9: 14
1. LITTLE INTEREST OR PLEASURE IN DOING THINGS: NOT AT ALL

## 2025-07-21 ASSESSMENT — PAIN SCALES - GENERAL: PAINLEVEL_OUTOF10: 0-NO PAIN

## 2025-07-21 NOTE — PROGRESS NOTES
Subjective   Patient ID: Carlos Hdez is a 35 y.o. male who presents for Chronic Acid Reflux.    HPI     Acid reflux: constant and > 6 months  No relief with diet modifications and OTC antacids   Denies abdominal pain  Weight gain : depressed- eating more- going thr' divorce    Review of Systems   All other systems reviewed and are negative.      Objective   /84 (BP Location: Left arm, Patient Position: Sitting, BP Cuff Size: Large adult)   Pulse 78   Temp 36.5 °C (97.7 °F) (Temporal)   Wt 136 kg (300 lb)   SpO2 95%   BMI 40.69 kg/m²     Physical Exam  Vitals and nursing note reviewed.     Cardiovascular:      Rate and Rhythm: Normal rate and regular rhythm.   Pulmonary:      Effort: Pulmonary effort is normal.      Breath sounds: Normal breath sounds.   Abdominal:      Tenderness: There is no abdominal tenderness.     Musculoskeletal:      Cervical back: Neck supple.   Lymphadenopathy:      Cervical: No cervical adenopathy.     Neurological:      Mental Status: He is alert.         Assessment/Plan   Diagnoses and all orders for this visit:  Gastroesophageal reflux disease, unspecified whether esophagitis present  -     Comprehensive Metabolic Panel; Future  -     CBC and Auto Differential; Future  -     Lipid panel; Future  -     Hemoglobin A1c; Future  -     TSH; Future  -     omeprazole (PriLOSEC) 40 mg DR capsule; Take 1 capsule (40 mg) by mouth once daily in the morning. Take before meals. Do not crush or chew.  Other orders  -     Follow Up In Primary Care - Established; Future

## 2025-08-24 ENCOUNTER — HOSPITAL ENCOUNTER (EMERGENCY)
Facility: HOSPITAL | Age: 35
Discharge: HOME | End: 2025-08-25
Payer: COMMERCIAL

## 2025-08-24 VITALS
DIASTOLIC BLOOD PRESSURE: 99 MMHG | OXYGEN SATURATION: 99 % | SYSTOLIC BLOOD PRESSURE: 164 MMHG | HEART RATE: 83 BPM | BODY MASS INDEX: 40.63 KG/M2 | WEIGHT: 300 LBS | RESPIRATION RATE: 16 BRPM | TEMPERATURE: 97.5 F | HEIGHT: 72 IN

## 2025-08-24 DIAGNOSIS — H11.001 PTERYGIUM OF RIGHT EYE: Primary | ICD-10-CM

## 2025-08-24 PROCEDURE — 99283 EMERGENCY DEPT VISIT LOW MDM: CPT

## 2025-08-24 RX ORDER — FLUORESCEIN SODIUM 1 MG/MG
1 STRIP OPHTHALMIC ONCE
Status: DISCONTINUED | OUTPATIENT
Start: 2025-08-24 | End: 2025-08-25 | Stop reason: HOSPADM

## 2025-08-24 RX ORDER — TETRACAINE HYDROCHLORIDE 5 MG/ML
1 SOLUTION OPHTHALMIC ONCE
Status: DISCONTINUED | OUTPATIENT
Start: 2025-08-24 | End: 2025-08-25 | Stop reason: HOSPADM

## 2025-08-24 ASSESSMENT — LIFESTYLE VARIABLES
EVER FELT BAD OR GUILTY ABOUT YOUR DRINKING: NO
EVER HAD A DRINK FIRST THING IN THE MORNING TO STEADY YOUR NERVES TO GET RID OF A HANGOVER: NO
TOTAL SCORE: 0
HAVE PEOPLE ANNOYED YOU BY CRITICIZING YOUR DRINKING: NO
HAVE YOU EVER FELT YOU SHOULD CUT DOWN ON YOUR DRINKING: NO

## 2025-08-24 ASSESSMENT — PAIN - FUNCTIONAL ASSESSMENT: PAIN_FUNCTIONAL_ASSESSMENT: 0-10

## 2025-08-24 ASSESSMENT — PAIN DESCRIPTION - ORIENTATION: ORIENTATION: RIGHT

## 2025-08-24 ASSESSMENT — VISUAL ACUITY
OD: 20/25
OU: 20/20
OS: 20/30

## 2025-08-24 ASSESSMENT — PAIN DESCRIPTION - PAIN TYPE: TYPE: ACUTE PAIN

## 2025-08-24 ASSESSMENT — PAIN DESCRIPTION - LOCATION: LOCATION: EYE

## 2025-08-24 ASSESSMENT — PAIN SCALES - GENERAL: PAINLEVEL_OUTOF10: 2

## 2025-09-22 ENCOUNTER — APPOINTMENT (OUTPATIENT)
Dept: PRIMARY CARE | Facility: CLINIC | Age: 35
End: 2025-09-22
Payer: COMMERCIAL

## 2025-12-09 ENCOUNTER — APPOINTMENT (OUTPATIENT)
Dept: PRIMARY CARE | Facility: CLINIC | Age: 35
End: 2025-12-09
Payer: COMMERCIAL

## (undated) DEVICE — SHEET,DRAPE,53X77,STERILE: Brand: MEDLINE

## (undated) DEVICE — NEEDLE SPNL 18GA L3.5IN W/ QNCKE SHARPER BVL DURA CLICK

## (undated) DEVICE — PACK,ARTHROSCOPY II,SIRUS: Brand: MEDLINE

## (undated) DEVICE — ZIMMER® STERILE DISPOSABLE TOURNIQUET CUFF, DUAL PORT, SINGLE BLADDER, 34 IN. (86 CM)

## (undated) DEVICE — COUNTER NDL 40 COUNT HLD 70 FOAM BLK ADH W/ MAG

## (undated) DEVICE — GOWN,AURORA,NONREINFORCED,LARGE: Brand: MEDLINE

## (undated) DEVICE — TUBING, SUCTION, 1/4" X 10', STRAIGHT: Brand: MEDLINE

## (undated) DEVICE — BANDAGE COMPR W6INXL5YD WHT BGE POLY COT M E WRP WV HK AND

## (undated) DEVICE — HYPODERMIC SAFETY NEEDLE: Brand: MAGELLAN

## (undated) DEVICE — SUTURE MCRYL SZ 3-0 L27IN ABSRB UD L19MM PS-2 3/8 CIR PRIM Y427H

## (undated) DEVICE — GOWN,SIRUS,POLYRNF,BRTHSLV,XLN/XXL,18/CS: Brand: MEDLINE

## (undated) DEVICE — APPLICATOR MEDICATED 26 CC SOLUTION HI LT ORNG CHLORAPREP

## (undated) DEVICE — ADHESIVE SKIN CLSR 0.7ML TOP DERMBND ADV

## (undated) DEVICE — [AGGRESSIVE PLUS CUTTER, ARTHROSCOPIC SHAVER BLADE,  DO NOT RESTERILIZE,  DO NOT USE IF PACKAGE IS DAMAGED,  KEEP DRY,  KEEP AWAY FROM SUNLIGHT]: Brand: FORMULA

## (undated) DEVICE — PAD,ABDOMINAL,8"X10",ST,LF: Brand: MEDLINE

## (undated) DEVICE — SPONGE GZ W4XL4IN COT 12 PLY TYP VII WVN C FLD DSGN

## (undated) DEVICE — COVER LT HNDL BLU PLAS

## (undated) DEVICE — PADDING UNDERCAST W4INXL12FT RAYON POLY SYN NONADHESIVE

## (undated) DEVICE — TUBING PMP L8FT LNG W/ CONN FOR AR-6400 REDEUCE

## (undated) DEVICE — GLOVE ORANGE PI 8 1/2   MSG9085

## (undated) DEVICE — CONNECTOR,T,STERILE: Brand: MEDLINE

## (undated) DEVICE — INTENDED FOR TISSUE SEPARATION, AND OTHER PROCEDURES THAT REQUIRE A SHARP SURGICAL BLADE TO PUNCTURE OR CUT.: Brand: BARD-PARKER ® CARBON RIB-BACK BLADES

## (undated) DEVICE — PADDING UNDERCAST W6INXL4YD RAYON POLY SYN NONADHESIVE

## (undated) DEVICE — 4-PORT MANIFOLD: Brand: NEPTUNE 2

## (undated) DEVICE — PADDING CAST W6INXL4YD RAYON UNDERCAST SOF-ROL

## (undated) DEVICE — GLOVE SURG SZ 9 THK91MIL LTX FREE SYN POLYISOPRENE ANTI

## (undated) DEVICE — LABEL MED MINI W/ MARKER

## (undated) DEVICE — BANDAGE COBAN 4 IN COMPR W4INXL5YD FOAM COHESIVE QUIK STK SELF ADH SFT

## (undated) DEVICE — 3M™ STERI-DRAPE™ U-DRAPE 1015: Brand: STERI-DRAPE™

## (undated) DEVICE — SYRINGE MED 10ML LUERLOCK TIP W/O SFTY DISP

## (undated) DEVICE — ABSORBENT ROLL ECODRI-SAFE

## (undated) DEVICE — SPONGE,LAP,18"X18",DLX,XR,ST,5/PK,40/PK: Brand: MEDLINE

## (undated) DEVICE — MARKER SURG SKIN GENTIAN VLT REG TIP W/ 6IN RUL

## (undated) DEVICE — TUBE IRRIG L8IN LNG PT W/ CONN FOR PMP SYS REDEUCE